# Patient Record
Sex: FEMALE | Employment: FULL TIME | URBAN - METROPOLITAN AREA
[De-identification: names, ages, dates, MRNs, and addresses within clinical notes are randomized per-mention and may not be internally consistent; named-entity substitution may affect disease eponyms.]

---

## 2022-09-22 PROBLEM — J02.9 ACUTE PHARYNGITIS, UNSPECIFIED: Status: ACTIVE | Noted: 2021-05-15

## 2022-09-22 PROBLEM — I83.899 SYMPTOMATIC RETICULAR VEINS: Status: ACTIVE | Noted: 2019-11-14

## 2022-09-22 PROBLEM — R35.0 INCREASED FREQUENCY OF URINATION: Status: ACTIVE | Noted: 2017-06-19

## 2022-09-22 PROBLEM — Z20.822 CONTACT WITH AND (SUSPECTED) EXPOSURE TO COVID-19: Status: ACTIVE | Noted: 2021-05-17

## 2022-09-22 PROBLEM — E66.9 OBESITY: Status: ACTIVE | Noted: 2017-06-19

## 2022-09-22 PROBLEM — N64.89 BREASTS ASYMMETRICAL: Status: ACTIVE | Noted: 2017-11-28

## 2022-09-22 PROBLEM — M25.50 MULTIPLE JOINT PAIN: Status: ACTIVE | Noted: 2017-06-19

## 2022-09-22 PROBLEM — M25.511 ACUTE PAIN OF RIGHT SHOULDER: Status: ACTIVE | Noted: 2019-11-14

## 2022-09-22 PROBLEM — M54.50 LOW BACK PAIN: Status: ACTIVE | Noted: 2017-06-19

## 2022-09-23 PROBLEM — F41.9 ANXIETY: Status: ACTIVE | Noted: 2022-09-23

## 2022-09-23 PROBLEM — M25.561 CHRONIC PAIN OF RIGHT KNEE: Status: ACTIVE | Noted: 2022-09-23

## 2022-09-23 PROBLEM — J02.9 ACUTE PHARYNGITIS, UNSPECIFIED: Status: RESOLVED | Noted: 2021-05-15 | Resolved: 2022-09-23

## 2022-09-23 PROBLEM — E66.9 OBESITY: Status: RESOLVED | Noted: 2017-06-19 | Resolved: 2022-09-23

## 2022-09-23 PROBLEM — E55.9 VITAMIN D DEFICIENCY: Status: RESOLVED | Noted: 2017-06-19 | Resolved: 2022-09-23

## 2022-09-23 PROBLEM — G47.00 INSOMNIA: Status: ACTIVE | Noted: 2022-09-23

## 2022-09-23 PROBLEM — M54.50 LOW BACK PAIN: Status: RESOLVED | Noted: 2017-06-19 | Resolved: 2022-09-23

## 2022-09-23 PROBLEM — R42 DIZZINESS: Status: RESOLVED | Noted: 2017-06-19 | Resolved: 2022-09-23

## 2022-09-23 PROBLEM — Z20.822 CONTACT WITH AND (SUSPECTED) EXPOSURE TO COVID-19: Status: RESOLVED | Noted: 2021-05-17 | Resolved: 2022-09-23

## 2022-09-23 PROBLEM — R79.89 ABNORMAL C-REACTIVE PROTEIN: Status: RESOLVED | Noted: 2018-06-21 | Resolved: 2022-09-23

## 2022-09-23 PROBLEM — M25.50 MULTIPLE JOINT PAIN: Status: RESOLVED | Noted: 2017-06-19 | Resolved: 2022-09-23

## 2022-09-23 PROBLEM — G89.29 CHRONIC PAIN OF RIGHT KNEE: Status: ACTIVE | Noted: 2022-09-23

## 2022-09-24 PROBLEM — E78.2 MIXED HYPERLIPIDEMIA: Status: ACTIVE | Noted: 2022-09-24

## 2022-10-31 PROBLEM — M25.531 BILATERAL WRIST PAIN: Status: ACTIVE | Noted: 2022-10-31

## 2022-10-31 PROBLEM — M79.642 BILATERAL HAND PAIN: Status: ACTIVE | Noted: 2022-10-31

## 2022-10-31 PROBLEM — R53.81 MALAISE: Status: ACTIVE | Noted: 2022-10-31

## 2022-10-31 PROBLEM — R20.0 NUMBNESS AND TINGLING IN BOTH HANDS: Status: ACTIVE | Noted: 2022-10-31

## 2022-10-31 PROBLEM — R10.33 PERIUMBILICAL ABDOMINAL CRAMPING: Status: ACTIVE | Noted: 2022-10-31

## 2022-10-31 PROBLEM — R20.2 NUMBNESS AND TINGLING IN BOTH HANDS: Status: ACTIVE | Noted: 2022-10-31

## 2022-10-31 PROBLEM — M79.641 BILATERAL HAND PAIN: Status: ACTIVE | Noted: 2022-10-31

## 2022-10-31 PROBLEM — M25.532 BILATERAL WRIST PAIN: Status: ACTIVE | Noted: 2022-10-31

## 2023-01-18 ENCOUNTER — PREP FOR PROCEDURE (OUTPATIENT)
Dept: GASTROENTEROLOGY | Age: 53
End: 2023-01-18

## 2023-01-18 PROBLEM — Z12.11 ENCOUNTER FOR SCREENING COLONOSCOPY: Status: ACTIVE | Noted: 2023-01-18

## 2023-01-18 RX ORDER — SODIUM CHLORIDE 0.9 % (FLUSH) 0.9 %
5-40 SYRINGE (ML) INJECTION PRN
Status: CANCELLED | OUTPATIENT
Start: 2023-01-18

## 2023-01-18 RX ORDER — SODIUM CHLORIDE 0.9 % (FLUSH) 0.9 %
5-40 SYRINGE (ML) INJECTION EVERY 12 HOURS SCHEDULED
Status: CANCELLED | OUTPATIENT
Start: 2023-01-18

## 2023-01-18 RX ORDER — SODIUM CHLORIDE 9 MG/ML
25 INJECTION, SOLUTION INTRAVENOUS PRN
Status: CANCELLED | OUTPATIENT
Start: 2023-01-18

## 2023-01-18 NOTE — H&P (VIEW-ONLY)
Margaret Frye (:  1970) is a 47 y.o. female, new patient here for evaluation of the following chief complaint(s):  Colonoscopy         ASSESSMENT/PLAN:  1. Encounter for screening colonoscopy  -     polyethylene glycol-electrolytes (COLYTE) 240 g SOLR solution; Take 4,000 mLs by mouth once for 1 dose, Disp-4000 mL, R-0Normal           Subjective   SUBJECTIVE/OBJECTIVE  Patient present to arrange a screening colonoscopic evaluation denied any rectal bleeding. She has occasional constipation. Denied fever and chills no family history of colon cancer no prior colonoscopy    No past medical history on file. Past Surgical History:   Procedure Laterality Date    OTHER SURGICAL HISTORY      vaginal wall mesh    OTHER SURGICAL HISTORY      gum surgery    OTHER SURGICAL HISTORY      Partial hysterectomy cervix removed per pt    TUBAL LIGATION               No Known Allergies       Review of Systems   Constitutional:  Negative for appetite change. HENT:  Negative for mouth sores and trouble swallowing. Respiratory:  Negative for shortness of breath. Cardiovascular:  Negative for chest pain. Gastrointestinal:  Negative for abdominal pain, blood in stool and vomiting. Skin:  Negative for color change. Allergic/Immunologic: Negative for food allergies. Neurological:  Negative for seizures and weakness. Hematological:  Does not bruise/bleed easily. Objective   Physical Exam  HENT:      Head: Normocephalic. Mouth/Throat:      Mouth: Mucous membranes are moist.   Eyes:      General: No scleral icterus. Cardiovascular:      Rate and Rhythm: Normal rate and regular rhythm. Pulmonary:      Effort: No respiratory distress. Abdominal:      General: There is no distension. Tenderness: There is no abdominal tenderness. There is no rebound. Lymphadenopathy:      Cervical: No cervical adenopathy. Skin:     Coloration: Skin is not jaundiced. Findings: No bruising. Neurological:      General: No focal deficit present. Mental Status: She is alert. Current Outpatient Medications   Medication Sig Dispense Refill    polyethylene glycol-electrolytes (COLYTE) 240 g SOLR solution Take 4,000 mLs by mouth once for 1 dose 4000 mL 0    atorvastatin (LIPITOR) 20 MG tablet Take 1 tablet by mouth daily (Patient not taking: Reported on 1/18/2023) 30 tablet 5    meloxicam (MOBIC) 7.5 MG tablet Take 1 tablet by mouth daily as needed for Pain (take with food-no other NSAIDs) (Patient not taking: Reported on 1/18/2023) 30 tablet 5     No current facility-administered medications for this visit. Family History   Problem Relation Age of Onset    High Cholesterol Mother     High Blood Pressure Mother     Heart Attack Father     Diabetes Brother        Return for colonoscopy. An electronic signature was used to authenticate this note.     --Britni Toledo MD

## 2023-01-26 NOTE — PERIOP NOTE
Patient verified name, , and procedure. Type: 1a; abbreviated assessment per anesthesia guidelines    Labs per anesthesia: none    Instructed pt that they will be notified the day before their procedure by the GI Lab for time of arrival if their procedure is [facility] and Pre-op for [place] cases. Arrival times should be called by 5 pm. If no phone is received the patient should contact their respective hospital. The GI lab telephone number is [phone] and ES Pre-op is [phone]. Follow diet and prep instructions per office including NPO status. If patient has NOT received instructions from office patient is advised to call surgeon office, verbalizes understanding. Bath or shower the night before and the am of surgery with non-moisturizing soap. No lotions, oils, powders, cologne on skin. No make up, eye make up or jewelry. Wear loose fitting comfortable, clean clothing. Must have adult present in building the entire time . Medications for the day of procedure none, patient to hold Vitamins and supplements 7 days and NSAIDS 5 days per anesthesia guidelines. The following discharge instructions reviewed with patient: medication given during procedure may cause drowsiness for several hours, therefore, do not drive or operate machinery for remainder of the day. You may not drink alcohol on the day of your procedure, please resume regular diet and activity unless otherwise directed. You may experience abdominal distention for several hours that is relieved by the passage of gas. Contact your physician if you have any of the following: fever or chills, severe abdominal pain or excessive amount of bleeding or a large amount when having a bowel movement.  Occasional specks of blood with bowel movement would not be unusual.

## 2023-01-27 NOTE — PROGRESS NOTES
Unable to confirm apt with pt. Left voicemail with 0730 arrival time. [Muscle Pain] : muscle pain [Back Pain] : back pain [Fever] : no fever [Chest Pain] : no chest pain [Chills] : no chills [Shortness Of Breath] : no shortness of breath [Palpitations] : no palpitations [Abdominal Pain] : no abdominal pain [Nausea] : no nausea [Cough] : no cough [Dysuria] : no dysuria [Vomiting] : no vomiting [Incontinence] : no incontinence [Muscle Weakness] : no muscle weakness

## 2023-01-29 ENCOUNTER — ANESTHESIA EVENT (OUTPATIENT)
Dept: ENDOSCOPY | Age: 53
End: 2023-01-29
Payer: COMMERCIAL

## 2023-01-29 RX ORDER — SODIUM CHLORIDE 0.9 % (FLUSH) 0.9 %
5-40 SYRINGE (ML) INJECTION EVERY 12 HOURS SCHEDULED
Status: CANCELLED | OUTPATIENT
Start: 2023-01-29

## 2023-01-29 RX ORDER — SODIUM CHLORIDE 0.9 % (FLUSH) 0.9 %
5-40 SYRINGE (ML) INJECTION PRN
Status: CANCELLED | OUTPATIENT
Start: 2023-01-29

## 2023-01-29 RX ORDER — ONDANSETRON 2 MG/ML
4 INJECTION INTRAMUSCULAR; INTRAVENOUS
Status: CANCELLED | OUTPATIENT
Start: 2023-01-29 | End: 2023-01-30

## 2023-01-29 RX ORDER — SODIUM CHLORIDE 9 MG/ML
INJECTION, SOLUTION INTRAVENOUS PRN
Status: CANCELLED | OUTPATIENT
Start: 2023-01-29

## 2023-01-30 ENCOUNTER — HOSPITAL ENCOUNTER (OUTPATIENT)
Age: 53
Setting detail: OUTPATIENT SURGERY
Discharge: HOME | End: 2023-01-30
Attending: INTERNAL MEDICINE | Admitting: INTERNAL MEDICINE
Payer: COMMERCIAL

## 2023-01-30 ENCOUNTER — ANESTHESIA (OUTPATIENT)
Dept: ENDOSCOPY | Age: 53
End: 2023-01-30
Payer: COMMERCIAL

## 2023-01-30 VITALS
SYSTOLIC BLOOD PRESSURE: 129 MMHG | HEIGHT: 63 IN | DIASTOLIC BLOOD PRESSURE: 65 MMHG | TEMPERATURE: 97.6 F | BODY MASS INDEX: 29.77 KG/M2 | RESPIRATION RATE: 16 BRPM | OXYGEN SATURATION: 99 % | WEIGHT: 168 LBS | HEART RATE: 61 BPM

## 2023-01-30 DIAGNOSIS — Z12.11 ENCOUNTER FOR SCREENING COLONOSCOPY: ICD-10-CM

## 2023-01-30 PROCEDURE — 3609010600 HC COLONOSCOPY POLYPECTOMY SNARE/COLD BIOPSY: Performed by: INTERNAL MEDICINE

## 2023-01-30 PROCEDURE — 3700000000 HC ANESTHESIA ATTENDED CARE: Performed by: INTERNAL MEDICINE

## 2023-01-30 PROCEDURE — 6360000002 HC RX W HCPCS: Performed by: NURSE ANESTHETIST, CERTIFIED REGISTERED

## 2023-01-30 PROCEDURE — 2580000003 HC RX 258: Performed by: ANESTHESIOLOGY

## 2023-01-30 PROCEDURE — 7100000010 HC PHASE II RECOVERY - FIRST 15 MIN: Performed by: INTERNAL MEDICINE

## 2023-01-30 PROCEDURE — 2709999900 HC NON-CHARGEABLE SUPPLY: Performed by: INTERNAL MEDICINE

## 2023-01-30 PROCEDURE — 3700000001 HC ADD 15 MINUTES (ANESTHESIA): Performed by: INTERNAL MEDICINE

## 2023-01-30 PROCEDURE — 7100000011 HC PHASE II RECOVERY - ADDTL 15 MIN: Performed by: INTERNAL MEDICINE

## 2023-01-30 RX ORDER — SODIUM CHLORIDE, SODIUM LACTATE, POTASSIUM CHLORIDE, CALCIUM CHLORIDE 600; 310; 30; 20 MG/100ML; MG/100ML; MG/100ML; MG/100ML
INJECTION, SOLUTION INTRAVENOUS CONTINUOUS
Status: DISCONTINUED | OUTPATIENT
Start: 2023-01-30 | End: 2023-01-30 | Stop reason: HOSPADM

## 2023-01-30 RX ORDER — PROPOFOL 10 MG/ML
INJECTION, EMULSION INTRAVENOUS PRN
Status: DISCONTINUED | OUTPATIENT
Start: 2023-01-30 | End: 2023-01-30 | Stop reason: SDUPTHER

## 2023-01-30 RX ORDER — SODIUM CHLORIDE 0.9 % (FLUSH) 0.9 %
5-40 SYRINGE (ML) INJECTION PRN
Status: DISCONTINUED | OUTPATIENT
Start: 2023-01-30 | End: 2023-01-30 | Stop reason: HOSPADM

## 2023-01-30 RX ORDER — LIDOCAINE HYDROCHLORIDE 10 MG/ML
1 INJECTION, SOLUTION INFILTRATION; PERINEURAL
Status: DISCONTINUED | OUTPATIENT
Start: 2023-01-30 | End: 2023-01-30 | Stop reason: HOSPADM

## 2023-01-30 RX ORDER — SODIUM CHLORIDE 0.9 % (FLUSH) 0.9 %
5-40 SYRINGE (ML) INJECTION EVERY 12 HOURS SCHEDULED
Status: DISCONTINUED | OUTPATIENT
Start: 2023-01-30 | End: 2023-01-30 | Stop reason: HOSPADM

## 2023-01-30 RX ORDER — SODIUM CHLORIDE 9 MG/ML
INJECTION, SOLUTION INTRAVENOUS PRN
Status: DISCONTINUED | OUTPATIENT
Start: 2023-01-30 | End: 2023-01-30 | Stop reason: HOSPADM

## 2023-01-30 RX ADMIN — SODIUM CHLORIDE, POTASSIUM CHLORIDE, SODIUM LACTATE AND CALCIUM CHLORIDE: 600; 310; 30; 20 INJECTION, SOLUTION INTRAVENOUS at 07:58

## 2023-01-30 RX ADMIN — PROPOFOL 120 MCG/KG/MIN: 10 INJECTION, EMULSION INTRAVENOUS at 08:44

## 2023-01-30 RX ADMIN — PROPOFOL 50 MG: 10 INJECTION, EMULSION INTRAVENOUS at 08:43

## 2023-01-30 ASSESSMENT — PAIN - FUNCTIONAL ASSESSMENT: PAIN_FUNCTIONAL_ASSESSMENT: NONE - DENIES PAIN

## 2023-01-30 NOTE — INTERVAL H&P NOTE
Update History & Physical    The patient's History and Physical of January 18,  was reviewed with the patient and I examined the patient. There was no change. The surgical site was confirmed by the patient and me. Plan: The risks, benefits, expected outcome, and alternative to the recommended procedure have been discussed with the patient. Patient understands and wants to proceed with the procedure.      Electronically signed by Tan Connell MD on 1/30/2023 at 8:08 AM

## 2023-01-30 NOTE — DISCHARGE INSTRUCTIONS
Gastrointestinal Colonoscopy/Flexible Sigmoidoscopy - Lower Exam Discharge Instructions    Call Dr. Anna Hanley at [phone] for any problems or questions. Contact the doctors office for follow up appointment as directed. Medication may cause drowsiness for several hours, therefore:  Do not drive or operate machinery for reminder of the day. No alcohol today. Do not make any important or legal decisions for 24 hours. Do not sign any legal documents for 24 hours. Ordinarily, you may resume regular diet and activity after exam unless otherwise specified by your physician. Because of air put into your colon during exam, you may experience some abdominal distension, relieved by the passage of gas, for several hours. Contact your physician if you have any of the following:  Excessive amount of bleeding - large amount when having a bowel movement. Occasional specks of blood with bowel movement would not be unusual.  Severe abdominal pain  Fever or Chills    Polyp Removal - follow these additional instructions   Take Metamucil - 1 tablespoon in juice every morning for 3 days, as needed for constipation.       Any additional instructions:  repeat colonoscopy in 2 years

## 2023-01-30 NOTE — PROCEDURES
Operative Report    Patient: Gin Smiley MRN: [id number]      YOB: 1970  Age: 47 y.o. Sex: female            Indications:  screening for colon cancer [unfilled]     Preoperative Evaluation: The patient was evaluated prior to the procedure in the GI lab admission area, the patient ASA was recorded . Consent was obtained from the patient with the risk of perforation bleeding and aspiration. Anesthesia: ELYSIA-per anesthesia    Complications: None; patient tolerated the procedure well. EBL -insignificant      Procedure: The patient was sedated in the left lateral decubitus position. Scope was advanced from the rectum to the cecum. Evaluation was performed to the cecum twice. The scope was withdrawn to the rectum, retroflexed view was performed. The rectal exam was normal.  Preparation was fair [place] score of 2/1/2:5 . Findings:   Exam to the cecum. 2 passes performed into the ascending colon. A 5 mm raised cecal polyp that was cold snared. Normal ascending colon mucosa. Normal transverse descending sigmoid and rectal mucosa with no sign of polyps. Scattered left-sided diverticulosis. The preparation was suboptimal with a [place] score of 5. Postoperative Diagnosis: 1-cecal polyp.   2-diverticulosis    [id number] Colonoscopy, Flexible; with removal of tumor(s), polyp(s), or other lesion(s) by snare technique      Recommendations:    - repeat exam in 2 years      Signed By:  Tian Rojas MD     January 30, 2023

## 2023-01-30 NOTE — ANESTHESIA POSTPROCEDURE EVALUATION
Department of Anesthesiology  Postprocedure Note    Patient: Alvin Saucedo  MRN: [id number]  YOB: 1970  Date of evaluation: 1/30/2023      Procedure Summary     Date: 01/30/23 Room / Location: [facility] ENDO 03 / [facility] ENDOSCOPY    Anesthesia Start: 5085 Anesthesia Stop: 5876    Procedure: COLONOSCOPY POLYPECTOMY SNARE/COLD BIOPSY (Lower GI Region) Diagnosis:       Encounter for screening colonoscopy      (Encounter for screening colonoscopy [Z12.11])    Surgeons: Judy Bravo MD Responsible Provider: Luci Alegria MD    Anesthesia Type: TIVA ASA Status: 2          Anesthesia Type: No value filed. Cameron Phase I: Cameron Score: 10    Cameron Phase II: Cameron Score: 10      Anesthesia Post Evaluation    Patient location during evaluation: PACU  Patient participation: complete - patient participated  Level of consciousness: awake and alert  Airway patency: patent  Nausea & Vomiting: no nausea and no vomiting  Complications: no  Cardiovascular status: hemodynamically stable  Respiratory status: acceptable  Hydration status: euvolemic  Comments: Blood pressure 127/65, pulse 59, temperature 97.6 °F (36.4 °C), temperature source Temporal, resp. rate 16, height 5' 3\" (1.6 m), weight 168 lb (76.2 kg), SpO2 100 %.       Pt stable for discharge from PACU  Multimodal analgesia pain management approach

## 2023-01-31 ENCOUNTER — OFFICE VISIT (OUTPATIENT)
Dept: FAMILY MEDICINE CLINIC | Facility: CLINIC | Age: 53
End: 2023-01-31
Payer: COMMERCIAL

## 2023-01-31 VITALS
RESPIRATION RATE: 18 BRPM | HEIGHT: 63 IN | HEART RATE: 77 BPM | DIASTOLIC BLOOD PRESSURE: 60 MMHG | BODY MASS INDEX: 29.52 KG/M2 | OXYGEN SATURATION: 98 % | WEIGHT: 166.6 LBS | SYSTOLIC BLOOD PRESSURE: 124 MMHG

## 2023-01-31 DIAGNOSIS — M79.641 BILATERAL HAND PAIN: ICD-10-CM

## 2023-01-31 DIAGNOSIS — M25.561 CHRONIC PAIN OF RIGHT KNEE: ICD-10-CM

## 2023-01-31 DIAGNOSIS — G89.29 CHRONIC PAIN OF RIGHT KNEE: ICD-10-CM

## 2023-01-31 DIAGNOSIS — R20.2 NUMBNESS AND TINGLING IN BOTH HANDS: ICD-10-CM

## 2023-01-31 DIAGNOSIS — G47.00 INSOMNIA, UNSPECIFIED TYPE: ICD-10-CM

## 2023-01-31 DIAGNOSIS — F32.A DEPRESSION, UNSPECIFIED DEPRESSION TYPE: Primary | ICD-10-CM

## 2023-01-31 DIAGNOSIS — E78.2 MIXED HYPERLIPIDEMIA: ICD-10-CM

## 2023-01-31 DIAGNOSIS — R20.0 NUMBNESS AND TINGLING IN BOTH HANDS: ICD-10-CM

## 2023-01-31 DIAGNOSIS — Z12.4 SCREENING FOR CERVICAL CANCER: ICD-10-CM

## 2023-01-31 DIAGNOSIS — M79.642 BILATERAL HAND PAIN: ICD-10-CM

## 2023-01-31 DIAGNOSIS — M25.531 BILATERAL WRIST PAIN: ICD-10-CM

## 2023-01-31 DIAGNOSIS — M25.532 BILATERAL WRIST PAIN: ICD-10-CM

## 2023-01-31 DIAGNOSIS — Z12.31 ENCOUNTER FOR SCREENING MAMMOGRAM FOR MALIGNANT NEOPLASM OF BREAST: ICD-10-CM

## 2023-01-31 DIAGNOSIS — Z23 ENCOUNTER FOR IMMUNIZATION: ICD-10-CM

## 2023-01-31 DIAGNOSIS — Z12.11 SCREENING FOR COLON CANCER: ICD-10-CM

## 2023-01-31 DIAGNOSIS — F41.9 ANXIETY: ICD-10-CM

## 2023-01-31 PROCEDURE — 99214 OFFICE O/P EST MOD 30 MIN: CPT | Performed by: NURSE PRACTITIONER

## 2023-01-31 RX ORDER — ATORVASTATIN CALCIUM 20 MG/1
20 TABLET, FILM COATED ORAL DAILY
Qty: 30 TABLET | Refills: 5 | Status: SHIPPED | OUTPATIENT
Start: 2023-01-31

## 2023-01-31 RX ORDER — MELOXICAM 7.5 MG/1
7.5 TABLET ORAL DAILY PRN
Qty: 30 TABLET | Refills: 5 | Status: SHIPPED | OUTPATIENT
Start: 2023-01-31

## 2023-01-31 RX ORDER — POLYETHYLENE GLYCOL-3350 AND ELECTROLYTES WITH FLAVOR PACK 240; 5.84; 2.98; 6.72; 22.72 G/278.26G; G/278.26G; G/278.26G; G/278.26G; G/278.26G
POWDER, FOR SOLUTION ORAL
COMMUNITY
Start: 2023-01-18 | End: 2023-01-31 | Stop reason: ALTCHOICE

## 2023-01-31 RX ORDER — BUSPIRONE HYDROCHLORIDE 7.5 MG/1
7.5 TABLET ORAL 2 TIMES DAILY
Qty: 60 TABLET | Refills: 5 | Status: SHIPPED | OUTPATIENT
Start: 2023-01-31

## 2023-01-31 ASSESSMENT — ANXIETY QUESTIONNAIRES
6. BECOMING EASILY ANNOYED OR IRRITABLE: 0
IF YOU CHECKED OFF ANY PROBLEMS ON THIS QUESTIONNAIRE, HOW DIFFICULT HAVE THESE PROBLEMS MADE IT FOR YOU TO DO YOUR WORK, TAKE CARE OF THINGS AT HOME, OR GET ALONG WITH OTHER PEOPLE: VERY DIFFICULT
4. TROUBLE RELAXING: 1
1. FEELING NERVOUS, ANXIOUS, OR ON EDGE: 3
GAD7 TOTAL SCORE: 9
5. BEING SO RESTLESS THAT IT IS HARD TO SIT STILL: 0
3. WORRYING TOO MUCH ABOUT DIFFERENT THINGS: 1
7. FEELING AFRAID AS IF SOMETHING AWFUL MIGHT HAPPEN: 1
2. NOT BEING ABLE TO STOP OR CONTROL WORRYING: 3

## 2023-01-31 ASSESSMENT — PATIENT HEALTH QUESTIONNAIRE - PHQ9
SUM OF ALL RESPONSES TO PHQ QUESTIONS 1-9: 12
SUM OF ALL RESPONSES TO PHQ QUESTIONS 1-9: 12
4. FEELING TIRED OR HAVING LITTLE ENERGY: 1
7. TROUBLE CONCENTRATING ON THINGS, SUCH AS READING THE NEWSPAPER OR WATCHING TELEVISION: 3
SUM OF ALL RESPONSES TO PHQ9 QUESTIONS 1 & 2: 3
3. TROUBLE FALLING OR STAYING ASLEEP: 3
SUM OF ALL RESPONSES TO PHQ QUESTIONS 1-9: 12
8. MOVING OR SPEAKING SO SLOWLY THAT OTHER PEOPLE COULD HAVE NOTICED. OR THE OPPOSITE, BEING SO FIGETY OR RESTLESS THAT YOU HAVE BEEN MOVING AROUND A LOT MORE THAN USUAL: 0
10. IF YOU CHECKED OFF ANY PROBLEMS, HOW DIFFICULT HAVE THESE PROBLEMS MADE IT FOR YOU TO DO YOUR WORK, TAKE CARE OF THINGS AT HOME, OR GET ALONG WITH OTHER PEOPLE: 2
1. LITTLE INTEREST OR PLEASURE IN DOING THINGS: 1
6. FEELING BAD ABOUT YOURSELF - OR THAT YOU ARE A FAILURE OR HAVE LET YOURSELF OR YOUR FAMILY DOWN: 1
9. THOUGHTS THAT YOU WOULD BE BETTER OFF DEAD, OR OF HURTING YOURSELF: 0
2. FEELING DOWN, DEPRESSED OR HOPELESS: 2
SUM OF ALL RESPONSES TO PHQ QUESTIONS 1-9: 12
5. POOR APPETITE OR OVEREATING: 1

## 2023-01-31 NOTE — PROGRESS NOTES
[facility] [address] [facility]  Phone: [phone] Fax [phone]  Blaine Baronkamilahkashif. Sangeeta MS, APRN, FNP-C  1/31/2023  Chief Complaint   Patient presents with    Follow-up     Pt here today for f/u to recheck depression, anxiety, insomnia as well as chronic MSK complaints and HLD. Pt reports  following previous POC as directed. Please see ROS/Assessment and Plan sections for full discussion/management of these issues. Pt is Hebrew speaking and her daughter accompanied her today and served as English/. Pt has not yet had her repeat fasting lipids drawn. ASSESSMENT/PLAN:  Below is the assessment and plan developed based on review of pertinent history, physical exam, labs, studies, and medications. 1. Depression, unspecified depression type  2. Anxiety  Pt has been having some increased depressed moods, feelings of worry, and difficulty falling asleep over past year due to the stress of caring for her  who suffers from Schizophrenia. Pt reports that her adult son and adult daughter have moved in with her to help and offer her good support. Pt reports taking Buspar 7.5 mg po bid and PRN OTC Melatonin as directed. Pt reports depressed moods and feelings of worry as well as difficulty falling asleep had  improved some, but then yesterday, the pt witnessed her  being arrested for a violent act against another individual.  is in [facility]. Pt reports that this significantly increased her stress levels. Pt continues to deny any SI, HI, hallucinations. Pt was previously referred to a counselor through [facility] but never heard about an appointment. Discussed with pt. I feel that counseling is really what she needs at this time. Urgent referral placed for outside counseling from [address] [place] [address] Psychiatry. Will continue current dose of Buspar-refill given. Will continue PRN OTC Melatonin. Pt to f/u with me in 4-6 weeks to recheck.  Pt agrees to call me sooner for concerns/new or worsening symptoms. Will monitor.   - busPIRone (BUSPAR) 7.5 MG tablet; Take 1 tablet by mouth 2 times daily  Dispense: 60 tablet; Refill: 5  - External Referral to Psychiatry    3. Insomnia, unspecified type  See # 1-2 above. - External Referral to Psychiatry    4. Chronic pain of right knee  Pt under care of POA for chronic right knee pain. Pt saw them 10/25/22 and 1/23/23. Had MRI 12/19/22. POA feels chronic pain related to meniscus issue. Recommended PT-referral placed but pt has yet to make an appointment. Also gave pt steroid injection at most recent appointment and continued PRN Mobic. Pt reports pain is much better. Now mild and much more manageable. Has f/u with POA 3/6/23. Discussed with pt. Will have pt continue POC/PRN Mobic per POA for chronic right knee pain-refill given. Pt given number to PT to call to schedule an appointment. Pt to f/u with me in 4-6 weeks. Will monitor.    - meloxicam (MOBIC) 7.5 MG tablet; Take 1 tablet by mouth daily as needed for Pain (take with food-no other NSAIDs)  Dispense: 30 tablet; Refill: 5    5. Bilateral hand pain  6. Bilateral wrist pain  7. Numbness and tingling in both hands  Pt reports having ongoing bilat hand/wrist pain with intermittent numbness/tingling to bilat hands for past several months. Symptoms worse when sleeping at night and when trying to use hands at work. Denies any increased warmth or erythema. Denies any focal weakness to bilat hands. Pt was given Rx for bilat cock up splints for pt to wear while at work to help, but never picked them up. Pt was referred to [facility] for further evaluation of possible CTS, but never heard about an appointment. Placed another referral. Pt reports taking Mobic 7.5 mg po daily PRN as directed with some relief. Discussed with pt. Will have pt go  bilat cock up splints for pt to wear while at work to help. Pt given number to [facility] to call to make an appointment. Pt can continue PRN Mobic as directed as well-refill given. Pt to f/u with me in 4-6 weeks to recheck. Will monitor.   - meloxicam (MOBIC) 7.5 MG tablet; Take 1 tablet by mouth daily as needed for Pain (take with food-no other NSAIDs)  Dispense: 30 tablet; Refill: 5  - [address] [place] [address]    8. Mixed hyperlipidemia  On 22, pt's trigs were elevated at 287, LDL-C was elevated at 102.6, and HDL was low at 31. Pt was started on Lipitor 20 mg po daily. Pt reports taking as directed and reports following heart healthy diet. Discussed with pt. Will have pt continue current dose of Lipitor for now-refill given and continue heart healthy diet. Will recheck fasting lipids prior to f/u with me in 4-6 weeks. Order already in. Will monitor.   - atorvastatin (LIPITOR) 20 MG tablet; Take 1 tablet by mouth daily  Dispense: 30 tablet; Refill: 5    9. Screening for cervical cancer  Pt reports having a hx of a partial hysterectomy. Per pt, cervix was removed and pt no longer gets PAP. 10. Screening for colon cancer  Pt had screening colonoscopy with GI-Dr. Raeford Cabot 23. Due for repeat screening colonoscopy at 2 year point in 2025. 11. Encounter for screening mammogram for malignant neoplasm of breast  Mammogram Birads 2 benign on 22 after comparing to previous. Recommend repeat screening mammogram in 1 year point in 2023. 12. Encounter for immunization  Pt UTD on Tdap. Pt declined Covid, Flu, Shingles vaccines today. Will continue to encourage pt to get UTD on these vaccines at future appointments. Return in about 4 weeks (around 2023) for F/u 4-6 weeks to recheck multiple complaints (have fasting lipids prior). Call sooner for concerns.         SUBJECTIVE/OBJECTIVE:    HPI 22-  [place] (: 1970) is a 49 y.o. female, Established patient patient, here for evaluation of the following chief complaint(s):  Follow-up (Pt here today for f/u for routine lab review and to recheck depression, anxiety, insomnia as well as chronic MSK complaints and HLD. Pt reports  following previous POC as directed. Please see ROS/Assessment and Plan sections for full discussion/management of these issues. Pt is Faroese speaking and her daughter accompanied her today and served as English/. )    HPI today-  Evelyne Cogan (: 1970) is a 47 y.o. female, Established patient patient, here for evaluation of the following chief complaint(s):  Follow-up (Pt here today for f/u to recheck depression, anxiety, insomnia as well as chronic MSK complaints and HLD. Pt reports  following previous POC as directed. Please see ROS/Assessment and Plan sections for full discussion/management of these issues. Pt is Faroese speaking and her daughter accompanied her today and served as English/. Pt has not yet had her repeat fasting lipids drawn. )     No Known Allergies  [unfilled]  History reviewed. No pertinent past medical history. Past Surgical History:   Procedure Laterality Date    COLONOSCOPY N/A 2023    COLONOSCOPY POLYPECTOMY SNARE/COLD BIOPSY performed by Erica Driver MD at [facility] ENDOSCOPY    OTHER SURGICAL HISTORY      vaginal wall mesh    OTHER SURGICAL HISTORY      gum surgery    OTHER SURGICAL HISTORY      Partial hysterectomy cervix removed per pt    TUBAL LIGATION       Family History   Problem Relation Age of Onset    High Cholesterol Mother     High Blood Pressure Mother     Heart Attack Father     Diabetes Brother      Social History     Tobacco Use   Smoking Status Never   Smokeless Tobacco Never         Review of Systems   Constitutional: Negative. Negative for appetite change, chills, diaphoresis, fatigue, fever and unexpected weight change. HENT: Negative.   Negative for congestion, dental problem, drooling, ear discharge, ear pain, facial swelling, hearing loss, mouth sores, nosebleeds, postnasal drip, rhinorrhea, sinus pressure, sinus pain, sneezing, sore throat, tinnitus, trouble swallowing and voice change. Eyes: Negative. Negative for pain, discharge and visual disturbance. Respiratory: Negative. Negative for apnea, cough, choking, chest tightness, shortness of breath, wheezing and stridor. Cardiovascular: Negative. Negative for chest pain, palpitations and leg swelling. Gastrointestinal: Negative. Negative for abdominal distention, abdominal pain, anal bleeding, blood in stool, constipation, diarrhea, nausea, rectal pain and vomiting. Endocrine: Negative. Negative for cold intolerance, heat intolerance, polydipsia, polyphagia and polyuria. Genitourinary: Negative. Negative for decreased urine volume, difficulty urinating, dyspareunia, dysuria, flank pain, frequency, genital sores, hematuria, menstrual problem, pelvic pain, urgency, vaginal bleeding, vaginal discharge and vaginal pain. Musculoskeletal:  Positive for arthralgias and joint swelling. Negative for back pain, gait problem, myalgias, neck pain and neck stiffness. Pt under care of POA for chronic right knee pain. Pt saw them 10/25/22 and 1/23/23. Had MRI 12/19/22. POA feels chronic pain related to meniscus issue. Recommended PT-referral placed but pt has yet to make an appointment. Also gave pt steroid injection at most recent appointment and continued PRN Mobic. Pt reports pain is much better. Now mild and much more manageable. Has f/u with POA 3/6/23. Pt reports having ongoing bilat hand/wrist pain with intermittent numbness/tingling to bilat hands for past several months. Symptoms worse when sleeping at night and when trying to use hands at work. Denies any increased warmth or erythema. Denies any focal weakness to bilat hands. Pt was given Rx for bilat cock up splints for pt to wear while at work to help, but never picked them up.  Pt was referred to [facility] for further evaluation of possible CTS, but never heard about an appointment. Placed another referral. Pt reports taking Mobic 7.5 mg po daily PRN as directed with some relief. Skin: Negative. Negative for color change, pallor, rash and wound. Allergic/Immunologic: Negative. Negative for environmental allergies. Neurological:  Positive for numbness (see MSK). Negative for dizziness, tremors, seizures, syncope, facial asymmetry, speech difficulty, weakness, light-headedness and headaches. Hematological: Negative. Negative for adenopathy. Does not bruise/bleed easily. Psychiatric/Behavioral:  Positive for dysphoric mood. Negative for hallucinations, self-injury, sleep disturbance and suicidal ideas. The patient is nervous/anxious. Pt has been having some increased depressed moods, feelings of worry, and difficulty falling asleep over past year due to the stress of caring for her  who suffers from Schizophrenia. Pt reports that her adult son and adult daughter have moved in with her to help and offer her good support. Pt reports taking Buspar 7.5 mg po bid and PRN OTC Melatonin as directed. Pt reports depressed moods and feelings of worry as well as difficulty falling asleep had  improved some, but then yesterday, the pt witnessed her  being arrested for a violent act against another individual.  is in [facility]. Pt reports that this significantly increased her stress levels. Pt continues to deny any SI, HI, hallucinations. Pt was previously referred to a counselor through [facility] but never heard about an appointment. Vitals:    01/31/23 1400   BP: 124/60   Pulse: 77   Resp: 18   SpO2: 98%       Physical Exam  Vitals reviewed. Constitutional:       General: She is not in acute distress. Appearance: Normal appearance. She is normal weight. She is not ill-appearing, toxic-appearing or diaphoretic. HENT:      Head: Normocephalic and atraumatic.       Mouth/Throat:      Mouth: Mucous membranes are moist. Pharynx: Oropharynx is clear. Eyes:      General: No scleral icterus. Extraocular Movements: Extraocular movements intact. Pupils: Pupils are equal, round, and reactive to light. Cardiovascular:      Rate and Rhythm: Normal rate and regular rhythm. Pulses: Normal pulses. Heart sounds: Normal heart sounds. No murmur heard. No friction rub. No gallop. Pulmonary:      Effort: Pulmonary effort is normal. No respiratory distress. Breath sounds: Normal breath sounds. No stridor. No wheezing, rhonchi or rales. Chest:      Chest wall: No tenderness. Abdominal:      General: Abdomen is flat. Bowel sounds are normal. There is no distension. Palpations: Abdomen is soft. Tenderness: There is no abdominal tenderness. There is no guarding. Musculoskeletal:         General: Swelling and tenderness present. No deformity or signs of injury. Normal range of motion. Cervical back: Normal range of motion and neck supple. No rigidity or tenderness. Right lower leg: No edema. Left lower leg: No edema. Comments: Focused on right knee: No tenderness, edema, erythema, or increased warmth noted to right knee. ROM intact without crepitus. Cannot displace in any plane. Pt able to bear weight and ambulates with steady gait, unassisted    Focused on bilat hands/wrists: No ttp noted to bilat hands. Mild generalized ttp noted to volar surface of bilat wrists with +Tinel's sign bilat. No increased warmth, edema, erythema noted to either hand or wrist. ROM intact to bilat hands/wrists without crepitus. No focal weakness noted to either hand. Strong and equal  strength bilat. Lymphadenopathy:      Cervical: No cervical adenopathy. Skin:     General: Skin is warm. Capillary Refill: Capillary refill takes less than 2 seconds. Coloration: Skin is not jaundiced or pale. Findings: No bruising, erythema, lesion or rash.    Neurological:      General: No focal deficit present. Mental Status: She is alert and oriented to person, place, and time. Cranial Nerves: No cranial nerve deficit. Sensory: No sensory deficit. Motor: No weakness. Coordination: Coordination normal.      Gait: Gait normal.   Psychiatric:         Mood and Affect: Mood normal.         Behavior: Behavior normal.         Thought Content: Thought content normal.         Judgment: Judgment normal.      Comments: Pt appears mildly anxious/depressed but is cooperative. Makes good eye contact. Clear mentation. Answers questions appropriately. Denies any SI, HI, hallucinations   PHQ-9 Total Score: 12 (1/31/2023  2:09 PM)  Thoughts that you would be better off dead, or of hurting yourself in some way: 0 (1/31/2023  2:09 PM)  DEBORA-7 SCREENING 1/31/2023 9/23/2022   Feeling nervous, anxious, or on edge Nearly every day Not at all   Not being able to stop or control worrying Nearly every day Nearly every day   Worrying too much about different things Several days More than half the days   Trouble relaxing Several days Not at all   Being so restless that it is hard to sit still Not at all Not at all   Becoming easily annoyed or irritable Not at all More than half the days   Feeling afraid as if something awful might happen Several days Several days   DEBORA-7 Total Score 9 8   How difficult have these problems made it for you to do your work, take care of things at home, or get along with other people? Very difficult Somewhat difficult       PLEASE NOTE:  This document has been produced using voice recognition software. Unrecognized errors in transcription may be present. On this date 1/31/2023 I have spent 30 minutes reviewing previous notes, test results and face to face with the patient discussing the diagnosis and importance of compliance with the treatment plan as well as documenting on the day of the visit. An electronic signature was used to authenticate this note.   -- Kartik Rg FLOYD [place]utherford - NP

## 2023-02-01 ASSESSMENT — ENCOUNTER SYMPTOMS
ANAL BLEEDING: 0
EYES NEGATIVE: 1
CHEST TIGHTNESS: 0
TROUBLE SWALLOWING: 0
STRIDOR: 0
SHORTNESS OF BREATH: 0
ABDOMINAL DISTENTION: 0
WHEEZING: 0
BLOOD IN STOOL: 0
FACIAL SWELLING: 0
VOMITING: 0
VOICE CHANGE: 0
RESPIRATORY NEGATIVE: 1
RECTAL PAIN: 0
BACK PAIN: 0
SINUS PAIN: 0
EYE PAIN: 0
DIARRHEA: 0
NAUSEA: 0
CHOKING: 0
SORE THROAT: 0
SINUS PRESSURE: 0
COUGH: 0
COLOR CHANGE: 0
ALLERGIC/IMMUNOLOGIC NEGATIVE: 1
GASTROINTESTINAL NEGATIVE: 1
EYE DISCHARGE: 0
APNEA: 0
RHINORRHEA: 0
CONSTIPATION: 0
ABDOMINAL PAIN: 0

## 2023-02-17 PROBLEM — Z12.11 ENCOUNTER FOR SCREENING COLONOSCOPY: Status: RESOLVED | Noted: 2023-01-18 | Resolved: 2023-02-17

## 2023-03-06 ENCOUNTER — NURSE ONLY (OUTPATIENT)
Dept: FAMILY MEDICINE CLINIC | Facility: CLINIC | Age: 53
End: 2023-03-06

## 2023-03-06 ENCOUNTER — OFFICE VISIT (OUTPATIENT)
Dept: ORTHOPEDIC SURGERY | Age: 53
End: 2023-03-06

## 2023-03-06 DIAGNOSIS — E78.2 MIXED HYPERLIPIDEMIA: ICD-10-CM

## 2023-03-06 DIAGNOSIS — M23.300 DEGENERATIVE TEAR OF LATERAL MENISCUS OF RIGHT KNEE: Primary | ICD-10-CM

## 2023-03-06 DIAGNOSIS — M25.461 EFFUSION OF RIGHT KNEE: ICD-10-CM

## 2023-03-06 DIAGNOSIS — M25.561 CHRONIC PAIN OF RIGHT KNEE: ICD-10-CM

## 2023-03-06 DIAGNOSIS — G89.29 CHRONIC PAIN OF RIGHT KNEE: ICD-10-CM

## 2023-03-06 NOTE — PROGRESS NOTES
Name: Atul Reyna  YOB: 1970  Gender: female  MRN: [id number]      CC: Knee Pain (R Knee )       HPI: Atul Reyna is a 47 y.o. female who returns for follow up on 3/6/2023. She reports he experienced relief following the injection, however the relief only lasted about 5 weeks. Following that she had a return of pain and swelling. Physical Examination:  General: no acute distress  Lungs: breathing easily  CV: regular rhythm by pulse  Right Knee:moderate effusion. TTP posterior knee/popliteal fossa. Pain with mcmurrays testing, ligamentously stable         Assessment:     ICD-10-CM    1. Degenerative tear of lateral meniscus of right knee  M23.300       2. Effusion of right knee  M25.461       3. Chronic pain of right knee  M25.561     G89.29           Plan:   Continues to have swelling and pain after transient response to intraarticular injection. I think she has a focal collection of synovitis, possible PVNS in addition to a meniscus tear. She has failed extensive non op care. We discussed the possibility of knee arthroscopy which she is ready to proceed with once her work schedule is arranged. We discussed the possibility of recurrence, and possible need for revision surgery with open approach if we are not able to get it all arthroscopically. We discussed the details risks and benefits of knee arthroscopy with meniscal debridement and possible chondroplasty including but not limited to anesthetic complications bleeding infection postoperative DVT/PE continued pain further progression of degenerative changes and incomplete resolution of symptoms as well as the possible need for further surgery in the rehab course and recovery period that is expected. Patient elects to proceed as planned after all of their questions have been answered     Surgical plan will be for right knee arthroscopy, synovectomy, partial meniscectomy        Seth Ty MD, [facility]

## 2023-03-07 LAB
CHOLEST SERPL-MCNC: 203 MG/DL
HDLC SERPL-MCNC: 40 MG/DL (ref 40–60)
HDLC SERPL: 5.1
LDLC SERPL CALC-MCNC: 127.6 MG/DL
TRIGL SERPL-MCNC: 177 MG/DL (ref 35–150)
VLDLC SERPL CALC-MCNC: 35.4 MG/DL (ref 6–23)

## 2023-03-09 ENCOUNTER — OFFICE VISIT (OUTPATIENT)
Dept: FAMILY MEDICINE CLINIC | Facility: CLINIC | Age: 53
End: 2023-03-09
Payer: COMMERCIAL

## 2023-03-09 VITALS
HEART RATE: 78 BPM | HEIGHT: 63 IN | OXYGEN SATURATION: 97 % | SYSTOLIC BLOOD PRESSURE: 122 MMHG | WEIGHT: 171 LBS | BODY MASS INDEX: 30.3 KG/M2 | RESPIRATION RATE: 16 BRPM | DIASTOLIC BLOOD PRESSURE: 78 MMHG

## 2023-03-09 DIAGNOSIS — Z23 ENCOUNTER FOR IMMUNIZATION: ICD-10-CM

## 2023-03-09 DIAGNOSIS — G89.29 CHRONIC PAIN OF RIGHT KNEE: ICD-10-CM

## 2023-03-09 DIAGNOSIS — Z12.4 SCREENING FOR CERVICAL CANCER: ICD-10-CM

## 2023-03-09 DIAGNOSIS — M25.532 BILATERAL WRIST PAIN: ICD-10-CM

## 2023-03-09 DIAGNOSIS — F41.9 ANXIETY: ICD-10-CM

## 2023-03-09 DIAGNOSIS — M79.641 BILATERAL HAND PAIN: ICD-10-CM

## 2023-03-09 DIAGNOSIS — F32.A DEPRESSION, UNSPECIFIED DEPRESSION TYPE: Primary | ICD-10-CM

## 2023-03-09 DIAGNOSIS — M25.561 CHRONIC PAIN OF RIGHT KNEE: ICD-10-CM

## 2023-03-09 DIAGNOSIS — Z12.11 SCREENING FOR COLON CANCER: ICD-10-CM

## 2023-03-09 DIAGNOSIS — R20.2 NUMBNESS AND TINGLING IN BOTH HANDS: ICD-10-CM

## 2023-03-09 DIAGNOSIS — G47.00 INSOMNIA, UNSPECIFIED TYPE: ICD-10-CM

## 2023-03-09 DIAGNOSIS — R20.0 NUMBNESS AND TINGLING IN BOTH HANDS: ICD-10-CM

## 2023-03-09 DIAGNOSIS — M79.642 BILATERAL HAND PAIN: ICD-10-CM

## 2023-03-09 DIAGNOSIS — M25.531 BILATERAL WRIST PAIN: ICD-10-CM

## 2023-03-09 DIAGNOSIS — Z12.31 ENCOUNTER FOR SCREENING MAMMOGRAM FOR MALIGNANT NEOPLASM OF BREAST: ICD-10-CM

## 2023-03-09 DIAGNOSIS — E78.2 MIXED HYPERLIPIDEMIA: ICD-10-CM

## 2023-03-09 PROBLEM — E87.6 HYPOKALEMIA: Status: RESOLVED | Noted: 2022-11-01 | Resolved: 2023-03-09

## 2023-03-09 PROBLEM — A08.4 VIRAL GASTROENTERITIS: Status: RESOLVED | Noted: 2022-10-31 | Resolved: 2023-03-09

## 2023-03-09 PROBLEM — R10.33 PERIUMBILICAL ABDOMINAL CRAMPING: Status: RESOLVED | Noted: 2022-10-31 | Resolved: 2023-03-09

## 2023-03-09 PROBLEM — R53.81 MALAISE: Status: RESOLVED | Noted: 2022-10-31 | Resolved: 2023-03-09

## 2023-03-09 PROBLEM — R19.7 DIARRHEA: Status: RESOLVED | Noted: 2022-10-31 | Resolved: 2023-03-09

## 2023-03-09 PROCEDURE — 99214 OFFICE O/P EST MOD 30 MIN: CPT | Performed by: NURSE PRACTITIONER

## 2023-03-09 RX ORDER — ATORVASTATIN CALCIUM 40 MG/1
40 TABLET, FILM COATED ORAL DAILY
Qty: 30 TABLET | Refills: 5 | Status: SHIPPED | OUTPATIENT
Start: 2023-03-09

## 2023-03-09 RX ORDER — BUSPIRONE HYDROCHLORIDE 7.5 MG/1
7.5 TABLET ORAL 2 TIMES DAILY
Qty: 60 TABLET | Refills: 5 | Status: SHIPPED | OUTPATIENT
Start: 2023-03-09

## 2023-03-09 RX ORDER — NAPROXEN 250 MG/1
250 TABLET ORAL 2 TIMES DAILY PRN
Qty: 30 TABLET | Refills: 5 | Status: SHIPPED | OUTPATIENT
Start: 2023-03-09

## 2023-03-09 NOTE — PROGRESS NOTES
[address] [facility] [place] 972, [facility]  Phone: [phone] Fax [phone]  Bridgett Gaines. Sangeeta MS, APRN, FNP-C  3/9/2023  Chief Complaint   Patient presents with    Follow-up     Pt here today for f/u to recheck depression, anxiety, insomnia as well as chronic MSK complaints and HLD. Pt reports taking medications/following previous POC as directed. Please see ROS/Assessment and Plan sections for full discussion/management of these issues. Pt is Turkish speaking and her daughter accompanied her today and served as English/. ASSESSMENT/PLAN:  Below is the assessment and plan developed based on review of pertinent history, physical exam, labs, studies, and medications. 1. Depression, unspecified depression type  2. Anxiety   Pt has been having some increased depressed moods, feelings of worry, and difficulty falling asleep over past year due to the stress of caring for her  who suffers from Schizophrenia. Pt reports that her adult son and adult daughter have moved in with her to help and offer her good support. Pt reports taking Buspar 7.5 mg po bid and PRN OTC Melatonin as directed. Pt reports depressed moods and feelings of worry as well as difficulty falling asleep have improved and she is tolerating her medications well. Pt continues to deny any SI, HI, hallucinations. Pt was previously referred to a counselor but never heard about an appointment. Discussed with pt. Will continue/refill Buspar at same dose and have pt continue PRN OTC Melatonin as directed. Pt given number to [facility] to call to schedule. Pt to f/u with me in 3 months. Will monitor.   - busPIRone (BUSPAR) 7.5 MG tablet; Take 1 tablet by mouth 2 times daily  Dispense: 60 tablet; Refill: 5    3. Insomnia, unspecified type  See # 1-2 above. 4. Chronic pain of right knee  Pt under care of [facility] for chronic right knee pain. Pt saw them most recently 3/6/23.   Had MRI 12/19/22. POA feels chronic pain related to meniscus issue. Gave pt steroid injection at appointment 1/23/23. Brought temporary relief, but symptoms starting to return-pain/edema. POA is recommending pt have a meniscus repair at this point. Pt is considering this and plans to get back to POA so she can schedule. Pt reports that she feels that PRN Naprosyn works better than PRN Mobic and would like refill on PRN Naprosyn. Discussed with pt. Will have pt continue POC per POA. Will have pt stop PRN Mobic and give refill on PRN Naprosyn per pt request. Pt to f/u with me in 3 months. Will monitor.   - naproxen (NAPROSYN) 250 MG tablet; Take 1 tablet by mouth 2 times daily as needed for Pain (take with food)  Dispense: 30 tablet; Refill: 5    5. Bilateral hand pain  6. Bilateral wrist pain  7. Numbness and tingling in both hands  Pt reports having ongoing bilat hand/wrist pain with intermittent numbness/tingling to bilat hands for past several months. Symptoms worse when sleeping at night and when trying to use hands at work. Denies any increased warmth or erythema. Denies any focal weakness to bilat hands. Pt was given Rx for bilat cock up splints for pt to wear while at work to help-pt reports wearing as directed. Pt was referred to [facility] for further evaluation of possible CTS, but never heard about an appointment. Pt reports that she feels that PRN Naprosyn works better than PRN Mobic and would like refill on PRN Naprosyn. Discussed with pt. Pt again given referral to [facility] for further evaluation of possible CTS. Will have pt continue wearing cock up splints as directed. Will have pt stop PRN Mobic and give refill on PRN Naprosyn per pt request. Pt to f/u with me in 3 months. Will monitor.   - naproxen (NAPROSYN) 250 MG tablet; Take 1 tablet by mouth 2 times daily as needed for Pain (take with food)  Dispense: 30 tablet; Refill: 5  - [address] [place] [address]    8.  Mixed hyperlipidemia  Pt reports taking Lipitor 20 mg po daily as directed and reports following heart healthy diet. On 3/6/23, pt's triglycerides improved to 177 but still elevated some. HDL was WNL 40. LDL-C was elevated and worsened some to 127.6 Discussed with pt. Will increase Lipitor to 40 mg po daily and have pt continue heart healthy diet. Will recheck fasting lipids/CMP prior to f/u with me in 3 months. Will monitor.   - atorvastatin (LIPITOR) 40 MG tablet; Take 1 tablet by mouth daily  Dispense: 30 tablet; Refill: 5  - Comprehensive Metabolic Panel; Future  - Lipid Panel; Future    9. Screening for cervical cancer  Pt reports having a hx of a partial hysterectomy. Per pt, cervix was removed and pt no longer gets PAP. 10. Screening for colon cancer  Pt had screening colonoscopy with GI-Dr. Apoorva Marquez 23. Due for repeat screening colonoscopy at 2 year point in 2025. 11. Encounter for screening mammogram for malignant neoplasm of breast  Mammogram Birads 2 benign on 22 after comparing to previous. Recommend repeat screening mammogram in 1 year point in 2023. 12. Encounter for immunization  Pt UTD on Tdap. Pt declined Covid, Flu, Shingles vaccines today. Will continue to encourage pt to get UTD on these vaccines at future appointments. Return in about 3 months (around 2023) for  To recheck chronic condtions (fasting labs prior). Call sooner for concerns. .    Increase Lipitor to 40. Recheck CMP/lipids 3 months. SUBJECTIVE/OBJECTIVE:    HPI 23-  Lethea Knife Carolann Naylor (: 1970) is a 47 y.o. female, Established patient patient, here for evaluation of the following chief complaint(s):  Follow-up (Pt here today for f/u to recheck depression, anxiety, insomnia as well as chronic MSK complaints and HLD. Pt reports  following previous POC as directed. Please see ROS/Assessment and Plan sections for full discussion/management of these issues.  Pt is Singaporean speaking and her daughter accompanied her today and served as English/. Pt has not yet had her repeat fasting lipids drawn. )    HPI today-  Radha Knott (: 1970) is a 47 y.o. female, Established patient patient, here for evaluation of the following chief complaint(s):  Follow-up (Pt here today for f/u to recheck depression, anxiety, insomnia as well as chronic MSK complaints and HLD. Pt reports taking medications/following previous POC as directed. Please see ROS/Assessment and Plan sections for full discussion/management of these issues. Pt is Kyrgyz speaking and her daughter accompanied her today and served as English/.)     No Known Allergies  [unfilled]  History reviewed. No pertinent past medical history. Past Surgical History:   Procedure Laterality Date    COLONOSCOPY N/A 2023    COLONOSCOPY POLYPECTOMY SNARE/COLD BIOPSY performed by Marlena Young MD at [facility] ENDOSCOPY    OTHER SURGICAL HISTORY      vaginal wall mesh    OTHER SURGICAL HISTORY      gum surgery    OTHER SURGICAL HISTORY      Partial hysterectomy cervix removed per pt    TUBAL LIGATION       Family History   Problem Relation Age of Onset    High Cholesterol Mother     High Blood Pressure Mother     Heart Attack Father     Diabetes Brother      Social History     Tobacco Use   Smoking Status Never   Smokeless Tobacco Never         Review of Systems   Constitutional: Negative. Negative for appetite change, chills, diaphoresis, fatigue, fever and unexpected weight change. HENT: Negative. Negative for congestion, dental problem, drooling, ear discharge, ear pain, facial swelling, hearing loss, mouth sores, nosebleeds, postnasal drip, rhinorrhea, sinus pressure, sinus pain, sneezing, sore throat, tinnitus, trouble swallowing and voice change. Eyes: Negative. Negative for pain, discharge and visual disturbance. Respiratory: Negative.   Negative for apnea, cough, choking, chest tightness, shortness of breath, wheezing and stridor. Cardiovascular: Negative. Negative for chest pain, palpitations and leg swelling. Gastrointestinal: Negative. Negative for abdominal distention, abdominal pain, anal bleeding, blood in stool, constipation, diarrhea, nausea, rectal pain and vomiting. Endocrine: Negative. Negative for cold intolerance, heat intolerance, polydipsia, polyphagia and polyuria. Genitourinary: Negative. Negative for decreased urine volume, difficulty urinating, dyspareunia, dysuria, flank pain, frequency, genital sores, hematuria, menstrual problem, pelvic pain, urgency, vaginal bleeding, vaginal discharge and vaginal pain. Musculoskeletal:  Positive for arthralgias and joint swelling. Negative for back pain, gait problem, myalgias, neck pain and neck stiffness. Pt under care of [facility] for chronic right knee pain. Pt saw them most recently 3/6/23. Had MRI 12/19/22. POA feels chronic pain related to meniscus issue. Gave pt steroid injection at appointment 1/23/23. Brought temporary relief, but symptoms starting to return-pain/edema. POA is recommending pt have a meniscus repair at this point. Pt is considering this and plans to get back to POA so she can schedule. Pt reports that she feels that PRN Naprosyn works better than PRN Mobic and would like refill on PRN Naprosyn. Pt reports having ongoing bilat hand/wrist pain with intermittent numbness/tingling to bilat hands for past several months. Symptoms worse when sleeping at night and when trying to use hands at work. Denies any increased warmth or erythema. Denies any focal weakness to bilat hands. Pt was given Rx for bilat cock up splints for pt to wear while at work to help-pt reports wearing as directed. Pt was referred to [facility] for further evaluation of possible CTS, but never heard about an appointment.  Pt reports that she feels that PRN Naprosyn works better than PRN Mobic and would like refill on PRN Naprosyn. Skin: Negative. Negative for color change, pallor, rash and wound. Allergic/Immunologic: Negative. Negative for environmental allergies. Neurological:  Positive for numbness (see MSK). Negative for dizziness, tremors, seizures, syncope, facial asymmetry, speech difficulty, weakness, light-headedness and headaches. Hematological: Negative. Negative for adenopathy. Does not bruise/bleed easily. Psychiatric/Behavioral:  Positive for dysphoric mood. Negative for hallucinations, self-injury, sleep disturbance and suicidal ideas. The patient is nervous/anxious. Pt has been having some increased depressed moods, feelings of worry, and difficulty falling asleep over past year due to the stress of caring for her  who suffers from Schizophrenia. Pt reports that her adult son and adult daughter have moved in with her to help and offer her good support. Pt reports taking Buspar 7.5 mg po bid and PRN OTC Melatonin as directed. Pt reports depressed moods and feelings of worry as well as difficulty falling asleep have improved and she is tolerating her medications well. Pt continues to deny any SI, HI, hallucinations. Pt was previously referred to a counselor but never heard about an appointment. Vitals:    03/09/23 1537   BP: 122/78   Pulse: 78   Resp: 16   SpO2: 97%       Physical Exam  Vitals reviewed. Constitutional:       General: She is not in acute distress. Appearance: Normal appearance. She is obese. She is not ill-appearing, toxic-appearing or diaphoretic. HENT:      Head: Normocephalic and atraumatic. Mouth/Throat:      Mouth: Mucous membranes are moist.      Pharynx: Oropharynx is clear. Eyes:      General: No scleral icterus. Extraocular Movements: Extraocular movements intact. Pupils: Pupils are equal, round, and reactive to light. Cardiovascular:      Rate and Rhythm: Normal rate and regular rhythm. Pulses: Normal pulses.       Heart sounds: Normal heart sounds. No murmur heard. No friction rub. No gallop. Pulmonary:      Effort: Pulmonary effort is normal. No respiratory distress. Breath sounds: Normal breath sounds. No stridor. No wheezing, rhonchi or rales. Chest:      Chest wall: No tenderness. Abdominal:      General: Abdomen is flat. Bowel sounds are normal. There is no distension. Palpations: Abdomen is soft. Tenderness: There is no abdominal tenderness. There is no guarding. Musculoskeletal:         General: Swelling and tenderness present. No deformity or signs of injury. Normal range of motion. Cervical back: Normal range of motion and neck supple. No rigidity or tenderness. Right lower leg: No edema. Left lower leg: No edema. Comments: Focused on right knee: No erythema or increased warmth noted to right knee, but does have minimal generalized tenderness and edema. ROM intact without crepitus. Cannot displace in any plane. Pt able to bear weight and ambulates with steady gait, unassisted    Focused on bilat hands/wrists: No ttp noted to bilat hands. Mild generalized ttp noted to volar surface of bilat wrists with +Tinel's sign bilat. No increased warmth, edema, erythema noted to either hand or wrist. ROM intact to bilat hands/wrists without crepitus. No focal weakness noted to either hand. Strong and equal  strength bilat. Lymphadenopathy:      Cervical: No cervical adenopathy. Skin:     General: Skin is warm. Capillary Refill: Capillary refill takes less than 2 seconds. Coloration: Skin is not jaundiced or pale. Findings: No bruising, erythema, lesion or rash. Neurological:      General: No focal deficit present. Mental Status: She is alert and oriented to person, place, and time. Cranial Nerves: No cranial nerve deficit. Sensory: No sensory deficit. Motor: No weakness.       Coordination: Coordination normal.      Gait: Gait normal. Psychiatric:         Mood and Affect: Mood normal.         Behavior: Behavior normal.         Thought Content: Thought content normal.         Judgment: Judgment normal.      Comments: Pt calm and cooperative. Makes good eye contact. Clear mentation. Answers questions appropriately.  Denies any SI, HI, hallucinations     Component      Latest Ref Rng & Units 9/23/2022           4:42 PM   Hepatitis C Ab      NONREACTIVE   NONREACTIVE     Component      Latest Ref Rng & Units 9/23/2022 9/23/2022           4:42 PM  4:42 PM   T4 Free      0.78 - 1.46 NG/DL  0.9   TSH, 3RD GENERATION      0.358 - 3.740 uIU/mL 1.870      Component      Latest Ref Rng & Units 3/6/2023 9/23/2022           9:24 AM  4:42 PM   CHOLESTEROL, TOTAL, 040536      <200 MG/ (H) 191   Triglycerides      35 - 150 MG/ (H) 287 (H)   HDL Cholesterol      40 - 60 MG/DL 40 31 (L)   LDL Calculated      <100 MG/.6 (H) 102.6 (H)   VLDL Cholesterol Calculated      6.0 - 23.0 MG/DL 35.4 (H) 57.4 (H)   Chol/HDL Ratio       5.1 6.2     Component      Latest Ref Rng & Units 10/31/2022           4:18 PM   WBC      4.3 - 11.1 K/uL 8.1   RBC      4.05 - 5.2 M/uL 4.58   Hemoglobin Quant      11.7 - 15.4 g/dL 13.2   Hematocrit      35.8 - 46.3 % 41.3   MCV      82 - 102 FL 90.2   MCH      26.1 - 32.9 PG 28.8   MCHC      31.4 - 35.0 g/dL 32.0   RDW      11.9 - 14.6 % 13.3   Platelet Count      514 - 450 K/uL 268   MPV      9.4 - 12.3 FL 11.2   Nucleated Red Blood Cells      0.0 - 0.2 K/uL 0.00   Differential Type       AUTOMATED   Seg Neutrophils      43 - 78 % 63   Lymphocytes      13 - 44 % 30   Monocytes      4.0 - 12.0 % 6   Eosinophils %      0.5 - 7.8 % 1   Basophils      0.0 - 2.0 % 0   Immature Granulocytes      0.0 - 5.0 % 0   Segs Absolute      1.7 - 8.2 K/UL 5.0   Absolute Lymph #      0.5 - 4.6 K/UL 2.4   Absolute Mono #      0.1 - 1.3 K/UL 0.5   Absolute Eos #      0.0 - 0.8 K/UL 0.1   Basophils Absolute      0.0 - 0.2 K/UL 0.0   Absolute Immature Granulocyte      0.0 - 0.5 K/UL 0.0     Component      Latest Ref Rng & Units 12/16/2022 10/31/2022          11:45 AM  4:18 PM   Sodium      133 - 143 mmol/L 142 138   Potassium      3.5 - 5.1 mmol/L 4.3 3.3 (L)   Chloride      101 - 110 mmol/L 109 104   CO2      21 - 32 mmol/L 25 28   Anion Gap      2 - 11 mmol/L 8 6   Glucose, Random      65 - 100 mg/dL 86 81   BUN,BUNPL      6 - 23 MG/DL 12 8   Creatinine      0.6 - 1.0 MG/DL 0.70 0.60   Est, Glom Filt Rate      >60 ml/min/1.73m2 >60 >60   CALCIUM, SERUM, [id number]      8.3 - 10.4 MG/DL 9.3 9.5   BILIRUBIN TOTAL      0.2 - 1.1 MG/DL  0.4   ALT      12 - 65 U/L  37   AST      15 - 37 U/L  22   Alk Phosphatase      50 - 136 U/L  121   Total Protein      6.3 - 8.2 g/dL  7.4   Albumin      3.5 - 5.0 g/dL  3.8   Globulin      2.8 - 4.5 g/dL  3.6   ALBUMIN/GLOBULIN RATIO      0.4 - 1.6    1.1     PLEASE NOTE:  This document has been produced using voice recognition software. Unrecognized errors in transcription may be present. On this date 3/9/2023 I have spent 30 minutes reviewing previous notes, test results and face to face with the patient discussing the diagnosis and importance of compliance with the treatment plan as well as documenting on the day of the visit. An electronic signature was used to authenticate this note.   -- Ras Hawk, FLOYD - NP

## 2023-03-10 DIAGNOSIS — G89.29 CHRONIC PAIN OF RIGHT KNEE: ICD-10-CM

## 2023-03-10 DIAGNOSIS — M23.300 DEGENERATIVE TEAR OF LATERAL MENISCUS OF RIGHT KNEE: Primary | ICD-10-CM

## 2023-03-10 DIAGNOSIS — M25.461 EFFUSION OF RIGHT KNEE: ICD-10-CM

## 2023-03-10 DIAGNOSIS — M25.561 CHRONIC PAIN OF RIGHT KNEE: ICD-10-CM

## 2023-03-13 ASSESSMENT — ENCOUNTER SYMPTOMS
RHINORRHEA: 0
SORE THROAT: 0
TROUBLE SWALLOWING: 0
DIARRHEA: 0
RECTAL PAIN: 0
EYES NEGATIVE: 1
WHEEZING: 0
GASTROINTESTINAL NEGATIVE: 1
BLOOD IN STOOL: 0
SINUS PRESSURE: 0
COUGH: 0
CHOKING: 0
RESPIRATORY NEGATIVE: 1
VOMITING: 0
CONSTIPATION: 0
ABDOMINAL DISTENTION: 0
APNEA: 0
ANAL BLEEDING: 0
ABDOMINAL PAIN: 0
SINUS PAIN: 0
EYE PAIN: 0
COLOR CHANGE: 0
NAUSEA: 0
SHORTNESS OF BREATH: 0
FACIAL SWELLING: 0
CHEST TIGHTNESS: 0
BACK PAIN: 0
VOICE CHANGE: 0
ALLERGIC/IMMUNOLOGIC NEGATIVE: 1
EYE DISCHARGE: 0
STRIDOR: 0

## 2023-04-05 ENCOUNTER — OFFICE VISIT (OUTPATIENT)
Dept: ORTHOPEDIC SURGERY | Age: 53
End: 2023-04-05

## 2023-04-05 DIAGNOSIS — G56.03 BILATERAL CARPAL TUNNEL SYNDROME: ICD-10-CM

## 2023-04-05 DIAGNOSIS — M18.11 ARTHRITIS OF CARPOMETACARPAL (CMC) JOINT OF RIGHT THUMB: ICD-10-CM

## 2023-04-05 DIAGNOSIS — M77.11 RIGHT TENNIS ELBOW: Primary | ICD-10-CM

## 2023-04-05 RX ORDER — BETAMETHASONE SODIUM PHOSPHATE AND BETAMETHASONE ACETATE 3; 3 MG/ML; MG/ML
6 INJECTION, SUSPENSION INTRA-ARTICULAR; INTRALESIONAL; INTRAMUSCULAR; SOFT TISSUE ONCE
Status: COMPLETED | OUTPATIENT
Start: 2023-04-05 | End: 2023-04-05

## 2023-04-05 RX ORDER — MELOXICAM 15 MG/1
15 TABLET ORAL DAILY
Qty: 60 TABLET | Refills: 0 | Status: SHIPPED | OUTPATIENT
Start: 2023-04-05 | End: 2023-06-04

## 2023-04-05 RX ORDER — BETAMETHASONE SODIUM PHOSPHATE AND BETAMETHASONE ACETATE 3; 3 MG/ML; MG/ML
12 INJECTION, SUSPENSION INTRA-ARTICULAR; INTRALESIONAL; INTRAMUSCULAR; SOFT TISSUE ONCE
Status: COMPLETED | OUTPATIENT
Start: 2023-04-05 | End: 2023-04-05

## 2023-04-05 RX ADMIN — BETAMETHASONE SODIUM PHOSPHATE AND BETAMETHASONE ACETATE 12 MG: 3; 3 INJECTION, SUSPENSION INTRA-ARTICULAR; INTRALESIONAL; INTRAMUSCULAR; SOFT TISSUE at 16:13

## 2023-04-05 RX ADMIN — BETAMETHASONE SODIUM PHOSPHATE AND BETAMETHASONE ACETATE 6 MG: 3; 3 INJECTION, SUSPENSION INTRA-ARTICULAR; INTRALESIONAL; INTRAMUSCULAR; SOFT TISSUE at 16:11

## 2023-04-05 NOTE — PATIENT INSTRUCTIONS
Patient Education        Tennis Elbow: Exercises  Introduction  Here are some examples of exercises for you to try. The exercises may be suggested for a condition or for rehabilitation. Start each exercise slowly. Ease off the exercises if you start to have pain. You will be told when to start these exercises and which ones will work best for you. How to do the exercises  Wrist flexor stretch    Extend your arm in front of you with your palm up. Bend your wrist, pointing your hand toward the floor. With your other hand, gently bend your wrist farther until you feel a mild to moderate stretch in your forearm. Hold for at least 15 to 30 seconds. Repeat 2 to 4 times. Wrist extensor stretch    Repeat steps 1 to 4 of the stretch above but begin with your extended hand palm down. Ball or sock squeeze    Hold a tennis ball (or a rolled-up sock) in your hand. Make a fist around the ball (or sock) and squeeze. Hold for about 6 seconds, and then relax for up to 10 seconds. Repeat 8 to 12 times. Switch the ball (or sock) to your other hand and do 8 to 12 times. Wrist deviation    Sit so that your arm is supported but your hand hangs off the edge of a flat surface, such as a table. Hold your hand out like you are shaking hands with someone. Move your hand up and down. Repeat this motion 8 to 12 times. Switch arms. Try to do this exercise twice with each hand. Wrist curls    Place your forearm on a table with your hand hanging over the edge of the table, palm up. Place a 1- to 2-pound weight in your hand. This may be a dumbbell, a can of food, or a filled water bottle. Slowly raise and lower the weight while keeping your forearm on the table and your palm facing up. Repeat this motion 8 to 12 times. Switch arms, and do steps 1 through 4. Repeat with your hand facing down toward the floor. Switch arms.   Biceps curls    Sit leaning forward with your legs slightly spread and your left hand on your left

## 2023-04-06 NOTE — PROGRESS NOTES
Patient was fit for a [facility] splint for patients right hand/joint. I demonstrated that the thumb slides into the opening and Velcro on the dorsal side of the hand. The strap continues around the thumb and Velcro's again on the ventral side of hand or palm, and then continues through the thumb and first finger to Velcro again on the dorsal side of hand. Patient was fitted and instructed on an  Wrist Splint for patients bilateral wrist. Patient is aware the hand slides in the brace with the thumb placed threw the thumb hole. I demonstrated the correct way to tighten the brace straps to allow for a comfortable fit. Patient understood the correct way to wear the brace. Patient read and signed documenting they understand and agree to [facility]'s current DME return policy.
MD  Orthopaedic Surgery  04/05/23  4:33 PM

## 2023-04-18 ENCOUNTER — ANESTHESIA EVENT (OUTPATIENT)
Dept: SURGERY | Age: 53
End: 2023-04-18
Payer: COMMERCIAL

## 2023-04-18 NOTE — PERIOP NOTE
Preop department called to notify patient of arrival time for scheduled procedure. Instructions given to   - Arrive at [address] - Remain NPO after midnight, unless otherwise indicated, including gum, mints, and ice chips. - Have a responsible adult to drive patient to the hospital, stay during surgery, and patient will need supervision 24 hours after anesthesia. - Use antibacterial soap in shower the night before surgery and on the morning of surgery.        Was patient contacted: yes, Johanny  Voicemail left: n/a  Numbers contacted: [phone]   Arrival time: 0600       With : Luis Chi ([id number])

## 2023-04-19 ENCOUNTER — HOSPITAL ENCOUNTER (OUTPATIENT)
Age: 53
Setting detail: OUTPATIENT SURGERY
Discharge: HOME | End: 2023-04-19
Attending: ORTHOPAEDIC SURGERY | Admitting: ORTHOPAEDIC SURGERY
Payer: COMMERCIAL

## 2023-04-19 ENCOUNTER — ANESTHESIA (OUTPATIENT)
Dept: SURGERY | Age: 53
End: 2023-04-19
Payer: COMMERCIAL

## 2023-04-19 VITALS
WEIGHT: 176 LBS | RESPIRATION RATE: 15 BRPM | SYSTOLIC BLOOD PRESSURE: 134 MMHG | DIASTOLIC BLOOD PRESSURE: 71 MMHG | HEIGHT: 63 IN | TEMPERATURE: 98 F | BODY MASS INDEX: 31.18 KG/M2 | OXYGEN SATURATION: 98 % | HEART RATE: 66 BPM

## 2023-04-19 DIAGNOSIS — M25.461 EFFUSION OF RIGHT KNEE: ICD-10-CM

## 2023-04-19 DIAGNOSIS — M23.300 DEGENERATIVE TEAR OF LATERAL MENISCUS OF RIGHT KNEE: ICD-10-CM

## 2023-04-19 PROCEDURE — 3600000014 HC SURGERY LEVEL 4 ADDTL 15MIN: Performed by: ORTHOPAEDIC SURGERY

## 2023-04-19 PROCEDURE — 6360000002 HC RX W HCPCS: Performed by: REGISTERED NURSE

## 2023-04-19 PROCEDURE — 6360000002 HC RX W HCPCS: Performed by: ORTHOPAEDIC SURGERY

## 2023-04-19 PROCEDURE — 3600000004 HC SURGERY LEVEL 4 BASE: Performed by: ORTHOPAEDIC SURGERY

## 2023-04-19 PROCEDURE — 2709999900 HC NON-CHARGEABLE SUPPLY: Performed by: ORTHOPAEDIC SURGERY

## 2023-04-19 PROCEDURE — 2500000003 HC RX 250 WO HCPCS: Performed by: REGISTERED NURSE

## 2023-04-19 PROCEDURE — 2720000010 HC SURG SUPPLY STERILE: Performed by: ORTHOPAEDIC SURGERY

## 2023-04-19 PROCEDURE — 2580000003 HC RX 258: Performed by: ANESTHESIOLOGY

## 2023-04-19 PROCEDURE — 6360000002 HC RX W HCPCS: Performed by: SPECIALIST/TECHNOLOGIST

## 2023-04-19 PROCEDURE — 7100000001 HC PACU RECOVERY - ADDTL 15 MIN: Performed by: ORTHOPAEDIC SURGERY

## 2023-04-19 PROCEDURE — 3700000001 HC ADD 15 MINUTES (ANESTHESIA): Performed by: ORTHOPAEDIC SURGERY

## 2023-04-19 PROCEDURE — 6360000002 HC RX W HCPCS: Performed by: ANESTHESIOLOGY

## 2023-04-19 PROCEDURE — 7100000010 HC PHASE II RECOVERY - FIRST 15 MIN: Performed by: ORTHOPAEDIC SURGERY

## 2023-04-19 PROCEDURE — 3700000000 HC ANESTHESIA ATTENDED CARE: Performed by: ORTHOPAEDIC SURGERY

## 2023-04-19 PROCEDURE — 7100000000 HC PACU RECOVERY - FIRST 15 MIN: Performed by: ORTHOPAEDIC SURGERY

## 2023-04-19 PROCEDURE — 6370000000 HC RX 637 (ALT 250 FOR IP): Performed by: ANESTHESIOLOGY

## 2023-04-19 PROCEDURE — 29881 ARTHRS KNE SRG MNISECTMY M/L: CPT | Performed by: ORTHOPAEDIC SURGERY

## 2023-04-19 RX ORDER — FENTANYL CITRATE 50 UG/ML
INJECTION, SOLUTION INTRAMUSCULAR; INTRAVENOUS PRN
Status: DISCONTINUED | OUTPATIENT
Start: 2023-04-19 | End: 2023-04-19 | Stop reason: SDUPTHER

## 2023-04-19 RX ORDER — DEXAMETHASONE SODIUM PHOSPHATE 4 MG/ML
INJECTION, SOLUTION INTRA-ARTICULAR; INTRALESIONAL; INTRAMUSCULAR; INTRAVENOUS; SOFT TISSUE PRN
Status: DISCONTINUED | OUTPATIENT
Start: 2023-04-19 | End: 2023-04-19 | Stop reason: SDUPTHER

## 2023-04-19 RX ORDER — OXYCODONE HYDROCHLORIDE 5 MG/1
10 TABLET ORAL PRN
Status: DISCONTINUED | OUTPATIENT
Start: 2023-04-19 | End: 2023-04-19 | Stop reason: HOSPADM

## 2023-04-19 RX ORDER — DIPHENHYDRAMINE HYDROCHLORIDE 50 MG/ML
12.5 INJECTION INTRAMUSCULAR; INTRAVENOUS
Status: DISCONTINUED | OUTPATIENT
Start: 2023-04-19 | End: 2023-04-19 | Stop reason: HOSPADM

## 2023-04-19 RX ORDER — SODIUM CHLORIDE 0.9 % (FLUSH) 0.9 %
5-40 SYRINGE (ML) INJECTION EVERY 12 HOURS SCHEDULED
Status: DISCONTINUED | OUTPATIENT
Start: 2023-04-19 | End: 2023-04-19 | Stop reason: HOSPADM

## 2023-04-19 RX ORDER — HYDROMORPHONE HYDROCHLORIDE 2 MG/ML
0.5 INJECTION, SOLUTION INTRAMUSCULAR; INTRAVENOUS; SUBCUTANEOUS EVERY 5 MIN PRN
Status: DISCONTINUED | OUTPATIENT
Start: 2023-04-19 | End: 2023-04-19 | Stop reason: HOSPADM

## 2023-04-19 RX ORDER — PROCHLORPERAZINE EDISYLATE 5 MG/ML
5 INJECTION INTRAMUSCULAR; INTRAVENOUS
Status: DISCONTINUED | OUTPATIENT
Start: 2023-04-19 | End: 2023-04-19 | Stop reason: HOSPADM

## 2023-04-19 RX ORDER — ONDANSETRON 2 MG/ML
INJECTION INTRAMUSCULAR; INTRAVENOUS PRN
Status: DISCONTINUED | OUTPATIENT
Start: 2023-04-19 | End: 2023-04-19 | Stop reason: SDUPTHER

## 2023-04-19 RX ORDER — MIDAZOLAM HYDROCHLORIDE 2 MG/2ML
2 INJECTION, SOLUTION INTRAMUSCULAR; INTRAVENOUS
Status: DISCONTINUED | OUTPATIENT
Start: 2023-04-19 | End: 2023-04-19 | Stop reason: HOSPADM

## 2023-04-19 RX ORDER — HYDROMORPHONE HYDROCHLORIDE 2 MG/ML
0.25 INJECTION, SOLUTION INTRAMUSCULAR; INTRAVENOUS; SUBCUTANEOUS EVERY 5 MIN PRN
Status: DISCONTINUED | OUTPATIENT
Start: 2023-04-19 | End: 2023-04-19 | Stop reason: HOSPADM

## 2023-04-19 RX ORDER — MIDAZOLAM HYDROCHLORIDE 1 MG/ML
INJECTION INTRAMUSCULAR; INTRAVENOUS PRN
Status: DISCONTINUED | OUTPATIENT
Start: 2023-04-19 | End: 2023-04-19 | Stop reason: SDUPTHER

## 2023-04-19 RX ORDER — PROPOFOL 10 MG/ML
INJECTION, EMULSION INTRAVENOUS PRN
Status: DISCONTINUED | OUTPATIENT
Start: 2023-04-19 | End: 2023-04-19 | Stop reason: SDUPTHER

## 2023-04-19 RX ORDER — SODIUM CHLORIDE 0.9 % (FLUSH) 0.9 %
5-40 SYRINGE (ML) INJECTION PRN
Status: DISCONTINUED | OUTPATIENT
Start: 2023-04-19 | End: 2023-04-19 | Stop reason: HOSPADM

## 2023-04-19 RX ORDER — ACETAMINOPHEN 500 MG
1000 TABLET ORAL ONCE
Status: COMPLETED | OUTPATIENT
Start: 2023-04-19 | End: 2023-04-19

## 2023-04-19 RX ORDER — ROPIVACAINE HYDROCHLORIDE 5 MG/ML
INJECTION, SOLUTION EPIDURAL; INFILTRATION; PERINEURAL PRN
Status: DISCONTINUED | OUTPATIENT
Start: 2023-04-19 | End: 2023-04-19 | Stop reason: HOSPADM

## 2023-04-19 RX ORDER — OXYCODONE HYDROCHLORIDE 5 MG/1
5 TABLET ORAL PRN
Status: DISCONTINUED | OUTPATIENT
Start: 2023-04-19 | End: 2023-04-19 | Stop reason: HOSPADM

## 2023-04-19 RX ORDER — LIDOCAINE HYDROCHLORIDE 10 MG/ML
1 INJECTION, SOLUTION INFILTRATION; PERINEURAL
Status: DISCONTINUED | OUTPATIENT
Start: 2023-04-19 | End: 2023-04-19 | Stop reason: HOSPADM

## 2023-04-19 RX ORDER — SODIUM CHLORIDE, SODIUM LACTATE, POTASSIUM CHLORIDE, CALCIUM CHLORIDE 600; 310; 30; 20 MG/100ML; MG/100ML; MG/100ML; MG/100ML
INJECTION, SOLUTION INTRAVENOUS CONTINUOUS
Status: DISCONTINUED | OUTPATIENT
Start: 2023-04-19 | End: 2023-04-19 | Stop reason: HOSPADM

## 2023-04-19 RX ORDER — SODIUM CHLORIDE 9 MG/ML
INJECTION, SOLUTION INTRAVENOUS PRN
Status: DISCONTINUED | OUTPATIENT
Start: 2023-04-19 | End: 2023-04-19 | Stop reason: HOSPADM

## 2023-04-19 RX ORDER — ONDANSETRON 2 MG/ML
4 INJECTION INTRAMUSCULAR; INTRAVENOUS
Status: DISCONTINUED | OUTPATIENT
Start: 2023-04-19 | End: 2023-04-19 | Stop reason: HOSPADM

## 2023-04-19 RX ORDER — LIDOCAINE HYDROCHLORIDE 20 MG/ML
INJECTION, SOLUTION EPIDURAL; INFILTRATION; INTRACAUDAL; PERINEURAL PRN
Status: DISCONTINUED | OUTPATIENT
Start: 2023-04-19 | End: 2023-04-19 | Stop reason: SDUPTHER

## 2023-04-19 RX ADMIN — HYDROMORPHONE HYDROCHLORIDE 0.5 MG: 2 INJECTION, SOLUTION INTRAMUSCULAR; INTRAVENOUS; SUBCUTANEOUS at 09:12

## 2023-04-19 RX ADMIN — DEXAMETHASONE SODIUM PHOSPHATE 4 MG: 4 INJECTION, SOLUTION INTRAMUSCULAR; INTRAVENOUS at 07:54

## 2023-04-19 RX ADMIN — LIDOCAINE HYDROCHLORIDE 100 MG: 20 INJECTION, SOLUTION EPIDURAL; INFILTRATION; INTRACAUDAL; PERINEURAL at 07:45

## 2023-04-19 RX ADMIN — PROPOFOL 200 MG: 10 INJECTION, EMULSION INTRAVENOUS at 07:45

## 2023-04-19 RX ADMIN — ACETAMINOPHEN 1000 MG: 500 TABLET, FILM COATED ORAL at 06:44

## 2023-04-19 RX ADMIN — MIDAZOLAM 2 MG: 1 INJECTION INTRAMUSCULAR; INTRAVENOUS at 07:38

## 2023-04-19 RX ADMIN — HYDROMORPHONE HYDROCHLORIDE 0.5 MG: 2 INJECTION, SOLUTION INTRAMUSCULAR; INTRAVENOUS; SUBCUTANEOUS at 09:17

## 2023-04-19 RX ADMIN — Medication 2000 MG: at 07:48

## 2023-04-19 RX ADMIN — SODIUM CHLORIDE, SODIUM LACTATE, POTASSIUM CHLORIDE, AND CALCIUM CHLORIDE: 600; 310; 30; 20 INJECTION, SOLUTION INTRAVENOUS at 07:33

## 2023-04-19 RX ADMIN — FENTANYL CITRATE 25 MCG: 50 INJECTION, SOLUTION INTRAMUSCULAR; INTRAVENOUS at 08:18

## 2023-04-19 RX ADMIN — FENTANYL CITRATE 25 MCG: 50 INJECTION, SOLUTION INTRAMUSCULAR; INTRAVENOUS at 08:23

## 2023-04-19 RX ADMIN — FENTANYL CITRATE 50 MCG: 50 INJECTION, SOLUTION INTRAMUSCULAR; INTRAVENOUS at 07:56

## 2023-04-19 RX ADMIN — ONDANSETRON 4 MG: 2 INJECTION INTRAMUSCULAR; INTRAVENOUS at 07:54

## 2023-04-19 ASSESSMENT — PAIN DESCRIPTION - LOCATION
LOCATION: KNEE
LOCATION: KNEE

## 2023-04-19 ASSESSMENT — PAIN - FUNCTIONAL ASSESSMENT: PAIN_FUNCTIONAL_ASSESSMENT: 0-10

## 2023-04-19 ASSESSMENT — PAIN DESCRIPTION - ORIENTATION
ORIENTATION: RIGHT
ORIENTATION: RIGHT

## 2023-04-19 ASSESSMENT — PAIN SCALES - GENERAL
PAINLEVEL_OUTOF10: 0
PAINLEVEL_OUTOF10: 8
PAINLEVEL_OUTOF10: 6
PAINLEVEL_OUTOF10: 0
PAINLEVEL_OUTOF10: 7
PAINLEVEL_OUTOF10: 3
PAINLEVEL_OUTOF10: 0
PAINLEVEL_OUTOF10: 3
PAINLEVEL_OUTOF10: 3

## 2023-04-19 ASSESSMENT — PAIN DESCRIPTION - DESCRIPTORS: DESCRIPTORS: ACHING;THROBBING

## 2023-04-19 NOTE — OP NOTE
Operative Report    Patient: Erwin Bell MRN: [id number]  SSN: [id number]    YOB: 1970  Age: 47 y.o. Sex: female       Date of Surgery: 4/19/2023     Preoperative Diagnosis: right  Knee lateral Meniscus tear  Right knee synovitis    Postoperative Diagnosis: Same    Surgeon(s) and Role:     * Dagoberto Drain, MD - Primary    Anesthesia: General     Procedure:    1) right  Knee arthroscopy with partial lateral meniscectomy/debridement  2) right knee arthroscopy with partial synovectomy      Estimated Blood Loss:  5 mL    Tourniquet Time:   Total Tourniquet Time Documented:  Thigh (Right) - 24 minutes  Total: Thigh (Right) - 24 minutes          Implants:   None           Specimens: * No specimens in log *        Drains: None                Complications: None    Counts: Sponge and needle counts were correct times two. Indications: See H&P      Findings:  Large lateral meniscus tear of the junction of mid body and anterior horn with a full-thickness radial component with instability chronic degenerative tear. Small focal collection of synovitis in the posterior compartment of the knee posterior to the PCL    Procedure in Detail: After informed consent was obtained the surgical site was marked in the preoperative area by myself the surgeon. Patient was brought to the operating room placed supine the operating table general anesthesia was induced. The operative extremity was prepped and draped in usual orthopedic sterile fashion timeout was performed per protocol. Antibiotics were given per protocol. Initially a standard inferolateral arthroscopy portal was established. Diagnostic arthroscopy was carried out. Suprapatellar pouch was benign. Patellofemoral compartment demonstrated mild degenerative changes of the articular surfaces of the undersurface of the patella and  of the trochlea. Medial and lateral gutters were free of loose bodies.   Anteromedial portal was established

## 2023-04-19 NOTE — PROGRESS NOTES
Patient/caregivers speak Fijian as their preferred language for their healthcare communication. For safe communication, use the [employer]  carts or call:    Charles Delgado Senior -Navigator ([phone])  General phone: [phone] ( [phone])  Email: [email or web address]    Always document the use of interpreting services ('s ID number) in your clinical notes. Our interpreters are available for team members working with limited  [place] proficient (LEP) patients remotely, via phone or video or in person (if needed for special cases). When using family members to interpret, for the safety of the patient and protection of the communication of both our patient and [facility] staff the VRI or telephonic  should remain on the line to monitor that all communication is accurate and complete. The  should be instructed to notify [facility] staff immediately if there are any inaccuracies.          Thank you,        Charles ALVAREZ  Senior /Navigator

## 2023-04-19 NOTE — ANESTHESIA PRE PROCEDURE
Department of Anesthesiology  Preprocedure Note       Name:  Simeon Garcia   Age:  47 y.o.  :  1970                                          MRN:  [id number]         Date:  2023      Surgeon: Pattie Paez):  Kenyetta Stuart MD    Procedure: Procedure(s):  RIGHT KNEE ARTHROSCOPY MAJOR SYNOVECTOMY, MEDIAL AND LATERAL MENISECTOMY    Medications prior to admission:   Prior to Admission medications    Medication Sig Start Date End Date Taking? Authorizing Provider   aspirin EC 81 MG EC tablet Take 1 tablet by mouth daily 23   Salvador Rad, APRN - CNP   meloxicam MENDEZ IVAN [facility]) 15 MG tablet Take 1 tablet by mouth daily 23  Katja Salinas MD   atorvastatin (LIPITOR) 40 MG tablet Take 1 tablet by mouth daily 3/9/23   Phoenix Butter, APRN - NP   naproxen (NAPROSYN) 250 MG tablet Take 1 tablet by mouth 2 times daily as needed for Pain (take with food) 3/9/23   Phoenix Butter APRN - NP   busPIRone (BUSPAR) 7.5 MG tablet Take 1 tablet by mouth 2 times daily 3/9/23   Phoenix Butter, APRN - NP       Current medications:    No current facility-administered medications for this visit. No current outpatient medications on file.      Facility-Administered Medications Ordered in Other Visits   Medication Dose Route Frequency Provider Last Rate Last Admin    sodium chloride flush 0.9 % injection 5-40 mL  5-40 mL IntraVENous 2 times per day Salvador Rad, APRN - CNP        sodium chloride flush 0.9 % injection 5-40 mL  5-40 mL IntraVENous PRN Salvador Rad, APRN - CNP        0.9 % sodium chloride infusion   IntraVENous PRN Salvador Rad, APRN - CNP        ceFAZolin (ANCEF) 2000 mg in sterile water 20 mL IV syringe  2,000 mg IntraVENous On Call to [address] [place] [address], APRN - CNP        lidocaine 1 % injection 1 mL  1 mL IntraDERmal Once PRN Mikey Weaver IV, MD        lactated ringers IV soln infusion   IntraVENous Continuous Mikey Weaver IV, MD        sodium chloride flush 0.9 %

## 2023-04-19 NOTE — DISCHARGE INSTRUCTIONS
Post-op instructions for Knee Arthroscopy (Dr. Qiana Ayala)    Day of Surgery:     DIET:   Begin with liquids and light foods (jell-o, soup, etc.). Progress to your normal diet if you are not nauseated. MEDICATION:   1. Pain- Norco (hydrocodone/acetaminophen) or Oxycodone. If you are taking oxycodone, you may also take two (2) Tylenol (acetaminophen) 500mg tabs every eight (8) hours. Do not take Tylenol (acetaminophen) if you are given Norco as this medicine already contains acetaminophen. Do not drink alcohol while taking pain medication. Slowly wean off the pain medication as the pain improves. 2. Aspirin 81 mg: Take one (1) tablet in the morning and at night each day x 2 weeks post-operatively. 3. Stool Softener-Pain medication can cause constipation. Be sure to drink plenty of water and take an over the counter stool softener as needed. ICE:  Do NOT put the ice machine directly on your skin. Use it frequently for the first 72 hours. You may also use a regular ice bag/pack for 20 minutes at a time. Place a thin layer of clothing or pillow case between ice pack and your skin. Ice for 20-30 minutes on and then 20-30 minutes off. If you have the Surgical dressing intact you may run your ice machine for as long as as comfortable as long as your skin is not irritated/burned. SHOWERING:  No showering. Leave bandages in place. ACTIVITY:  Keep leg elevated on a pillow placed under your ankle. **DO NOT PUT A PILLOW UNDER YOUR KNEE!!**  It is important for you to keep your leg straight. Use crutches and you may put light weight on the operative leg. EXERCISES:  Begin ankle pumps. Attempt quadriceps sets and straight leg raises    First & Second Post-Op Day:    MEDICATION: Continue as instructed above. BANDAGES: No showering. Keep bandage in place. EXERCISES: Continue Quad sets and ankle pumps as above. Bend and extend the knee as tolerated. You may put light weight on the leg.  Continue to

## 2023-04-19 NOTE — INTERVAL H&P NOTE
Update History & Physical    The Patient's History and Physical was reviewed   I discussed the surgery and patients medical condition with the patient. The chart was reviewed with the patient and I examined the patient. There was no change from previous note. The surgical site was confirmed by the patient and me. CV: RRR  RESP: CTAB    Plan:  The risk, benefits, expected outcome, and alternative to the recommended procedure have been discussed with the patient. Patient understands and elects to proceed with the procedure as planned.     Electronically signed by Michaela Ricketts MD on 04/19/23 7:01 AM

## 2023-04-19 NOTE — ANESTHESIA PROCEDURE NOTES
Airway  Date/Time: 4/19/2023 7:47 AM  Urgency: elective    Airway not difficult    General Information and Staff    Patient location during procedure: OR  Resident/CRNA: FLOYD Singh - CRNA  Performed: resident/CRNA     Indications and Patient Condition  Indications for airway management: anesthesia  Spontaneous ventilation: present  Sedation level: deep  Preoxygenated: yes  Patient position: sniffing  MILS not maintained throughout  Mask difficulty assessment: vent by bag mask    Final Airway Details  Final airway type: supraglottic airway      Successful airway: oropharyngeal  Size 4     Number of attempts at approach: 1  Ventilation between attempts: supraglottic airway  Number of other approaches attempted: 0    no

## 2023-04-21 NOTE — PROGRESS NOTES
Spiritual Care visit. Initial Visit, Pre Surgery Consult. Visit and prayer before patient goes to surgery.     Visit by Favio Yen M.Ed., Th.B. ,Staff

## 2023-05-02 ENCOUNTER — OFFICE VISIT (OUTPATIENT)
Dept: ORTHOPEDIC SURGERY | Age: 53
End: 2023-05-02

## 2023-05-02 ENCOUNTER — CLINICAL DOCUMENTATION (OUTPATIENT)
Dept: ORTHOPEDIC SURGERY | Age: 53
End: 2023-05-02

## 2023-05-02 DIAGNOSIS — M23.300 DEGENERATIVE TEAR OF LATERAL MENISCUS OF RIGHT KNEE: ICD-10-CM

## 2023-05-02 DIAGNOSIS — Z09 S/P ORTHOPEDIC SURGERY, FOLLOW-UP EXAM: Primary | ICD-10-CM

## 2023-05-02 PROCEDURE — 99024 POSTOP FOLLOW-UP VISIT: CPT | Performed by: ORTHOPAEDIC SURGERY

## 2023-05-02 NOTE — PROGRESS NOTES
Name: Isabell Wing  YOB: 1970  Gender: female  MRN: [id number]      Procedure: Right Knee Arthroscopy Major Synovectomy,  Lateral Menisectomy - Right    Surgery Date: 4/19/2023          Subjective: She is 13 days s/p of a knee arthroscopy. Pain is controlled improving with motion. She has yet to attend formal physical therapy and continues ambulating with crutches      Physical Examination: Incisions are healing well. Able to activate quad but has appropriate atrophy. Passive extension to about 0 degrees of hyperextension flexion to 90 degrees. Patella is mobile. Moderate appropriate effusion. Motor and sensory intact. Assessment:   1. S/P orthopedic surgery, follow-up exam    2. Degenerative tear of lateral meniscus of right knee           Plan:     Advised ice and compression therapy. I voiced my concern with the patient that she has not started formal physical therapy yet and I recommend she start as soon as possible. Physical therapy will transition her off of the crutches and work on her range of motion. We will discuss her return to work at her next visit as she wishes to return to work 6 weeks after surgery.     Follow up: 4 weeks    Tyra Harris NP dictating as a scribe for Phillip Farris MD

## 2023-05-03 ENCOUNTER — EVALUATION (OUTPATIENT)
Age: 53
End: 2023-05-03
Payer: COMMERCIAL

## 2023-05-03 DIAGNOSIS — R29.898 IMPAIRED STRENGTH OF LOWER EXTREMITY: ICD-10-CM

## 2023-05-03 DIAGNOSIS — R68.89 DECREASED ACTIVITY TOLERANCE: ICD-10-CM

## 2023-05-03 DIAGNOSIS — Z74.09 IMPAIRED FUNCTIONAL MOBILITY, BALANCE, GAIT, AND ENDURANCE: ICD-10-CM

## 2023-05-03 DIAGNOSIS — M25.469 KNEE SWELLING: ICD-10-CM

## 2023-05-03 DIAGNOSIS — Z78.9 DECREASED ACTIVITIES OF DAILY LIVING (ADL): ICD-10-CM

## 2023-05-03 DIAGNOSIS — M25.661 DECREASED RANGE OF MOTION OF RIGHT KNEE: ICD-10-CM

## 2023-05-03 DIAGNOSIS — M25.561 RIGHT KNEE PAIN, UNSPECIFIED CHRONICITY: Primary | ICD-10-CM

## 2023-05-03 DIAGNOSIS — M23.300 DEGENERATIVE TEAR OF LATERAL MENISCUS OF RIGHT KNEE: ICD-10-CM

## 2023-05-03 PROCEDURE — 97110 THERAPEUTIC EXERCISES: CPT | Performed by: PHYSICAL THERAPIST

## 2023-05-03 PROCEDURE — 97162 PT EVAL MOD COMPLEX 30 MIN: CPT | Performed by: PHYSICAL THERAPIST

## 2023-05-03 NOTE — PROGRESS NOTES
reps      CLINICAL DECISION MAKING/ASSESSMENT     PERSONAL FACTORS/CO-MORBIDITIES AFFECTING POC (1-2 Medium/3+High):   age  body measurements  language  profession   PROBLEM LIST: (1-2 Low/ 3 Medium/ 4+ High):  Pain  Compromised skin integrity  Localized swelling/edema  ROM limitations  Strength deficits  Motor control deficits  Impaired gait  Impaired balance/proprioception  Decreased endurance/activity Tolerance  ADL/functional limitations/modifications  Limited work/occupational capacity  Restricted social activity  Restricted recreational participation  Decreased Knowledge of Precautions  Decreased [place] with Home Exercise Program    CLINICAL DECISION MAKING:   moderate complexity with questionable prediction of expectations and future outcomes which may require adjustments to the POC. PROGNOSIS:   good   Benefits and precautions of treatment explained to patient. Silvestre Al is a 47 y.o. female who presents to therapy today with evolving/changing clinical presentation (moderate complexity)  related to R knee arthroscopy. Pt would benefit from skilled physical therapy services to address the deficits noted above for return to prior level of function. PLAN OF CARE     EFFECTIVE DATES: 5/3/2023 TO 6/15/2023  (42 days).     FREQUENCY/DURATION: 1 - 2x/wk for 42 Day(s)  INTERVENTIONS MAY INCLUDE BUT ARE NOT LIMITED TO: (36934) Therapeutic exercise to develop ROM, strength, endurance and flexibility  (11654) Therapeutic activities using dynamic activities to improve function  (76996) Gait training to address mechanics, proper step length and weight shifting to improve household and community mobility as well as overall safety with ADLs  (51404) Manual therapy techniques to improve joint and/or soft tissue mobility, ROM, and function as well as helping to decrease pain/spasms and swelling  (10984) Neuromuscular reeducation addressing impaired balance, coordination, kinesthetic sense, posture and

## 2023-05-08 ENCOUNTER — TREATMENT (OUTPATIENT)
Age: 53
End: 2023-05-08
Payer: COMMERCIAL

## 2023-05-08 DIAGNOSIS — Z74.09 IMPAIRED FUNCTIONAL MOBILITY, BALANCE, GAIT, AND ENDURANCE: ICD-10-CM

## 2023-05-08 DIAGNOSIS — M25.561 RIGHT KNEE PAIN, UNSPECIFIED CHRONICITY: Primary | ICD-10-CM

## 2023-05-08 DIAGNOSIS — M25.661 DECREASED RANGE OF MOTION OF RIGHT KNEE: ICD-10-CM

## 2023-05-08 DIAGNOSIS — Z78.9 DECREASED ACTIVITIES OF DAILY LIVING (ADL): ICD-10-CM

## 2023-05-08 DIAGNOSIS — M25.469 KNEE SWELLING: ICD-10-CM

## 2023-05-08 DIAGNOSIS — R29.898 IMPAIRED STRENGTH OF LOWER EXTREMITY: ICD-10-CM

## 2023-05-08 DIAGNOSIS — R68.89 DECREASED ACTIVITY TOLERANCE: ICD-10-CM

## 2023-05-08 PROCEDURE — 97110 THERAPEUTIC EXERCISES: CPT | Performed by: PHYSICAL THERAPIST

## 2023-05-08 NOTE — PROGRESS NOTES
[facility]TH[facility]ED[facility]                                                       [address] [place] [address]                                                         Dept: [phone]                                   PHYSICAL THERAPY DAILY NOTE     DATE of EVAL: 5/3/23    CURRENT POC ENDS: 6/15/23    DATE of LAST PROGRESS NOTE: ---    DATE of SURGERY: Wed. 4/19/23  WEEKS POST OP: 2+    DATE of NEXT MD/PROVIDER APPT: --    PT SESSION: 2 of --    TOTAL TIMED PROCEDURE CODES: 40 MIN. TOTAL TIME: 40 MIN. REFERRING PROVIDER: FLOYD Ramos  DIAGNOSIS:     ICD-10-CM    1. Right knee pain, unspecified chronicity  M25.561       2. Knee swelling  M25.469       3. Decreased range of motion of right knee  M25.661       4. Impaired strength of lower extremity  R29.898       5. Impaired functional mobility, balance, gait, and endurance  Z74.09       6. Decreased activities of daily living (ADL)  Z78.9       7. Decreased activity tolerance  R68.89            MEDICATIONS: Reviewed in chart. ALLERGIES: No Known Allergies     THERAPY PRECAUTIONS: None. CO - MORBIDITIES AFFECTING POC: None.      PERTINENT MEDICAL HISTORY      Past Medical History:   Diagnosis Date    Anxiety     managed eith medication    Arthritis     rt thumb    COVID-19 2022    not hospitalized    PONV (postoperative nausea and vomiting)       Past Surgical History:   Procedure Laterality Date    COLONOSCOPY N/A 1/30/2023    COLONOSCOPY POLYPECTOMY SNARE/COLD BIOPSY performed by Tyler Bae MD at [facility] ENDOSCOPY    KNEE ARTHROSCOPY Right 4/19/2023    RIGHT KNEE ARTHROSCOPY MAJOR SYNOVECTOMY,  LATERAL MENISECTOMY performed by John Newby MD at [place] [address]      vaginal wall mesh    OTHER SURGICAL HISTORY      gum surgery    OTHER

## 2023-05-11 ENCOUNTER — TREATMENT (OUTPATIENT)
Age: 53
End: 2023-05-11

## 2023-05-11 DIAGNOSIS — R68.89 DECREASED ACTIVITY TOLERANCE: ICD-10-CM

## 2023-05-11 DIAGNOSIS — M25.661 DECREASED RANGE OF MOTION OF RIGHT KNEE: ICD-10-CM

## 2023-05-11 DIAGNOSIS — Z78.9 DECREASED ACTIVITIES OF DAILY LIVING (ADL): ICD-10-CM

## 2023-05-11 DIAGNOSIS — Z74.09 IMPAIRED FUNCTIONAL MOBILITY, BALANCE, GAIT, AND ENDURANCE: ICD-10-CM

## 2023-05-11 DIAGNOSIS — M25.469 KNEE SWELLING: ICD-10-CM

## 2023-05-11 DIAGNOSIS — M25.561 RIGHT KNEE PAIN, UNSPECIFIED CHRONICITY: Primary | ICD-10-CM

## 2023-05-11 DIAGNOSIS — R29.898 IMPAIRED STRENGTH OF LOWER EXTREMITY: ICD-10-CM

## 2023-05-11 NOTE — PROGRESS NOTES
[facility] ORTHOPAEDICS                                                       [address] [place] [address]                                                         Dept: [phone]                                   PHYSICAL THERAPY DAILY NOTE     DATE of EVAL: 5/3/23    CURRENT POC ENDS: 6/15/23    DATE of LAST PROGRESS NOTE: ---    DATE of SURGERY: Wed. 4/19/23  WEEKS POST OP: 3    DATE of NEXT MD/PROVIDER APPT: --    PT SESSION: 3 of --    TOTAL TIMED PROCEDURE CODES: 40 MIN. TOTAL TIME: 50 MIN. REFERRING PROVIDER: LFOYD Marie  DIAGNOSIS:     ICD-10-CM    1. Right knee pain, unspecified chronicity  M25.561       2. Knee swelling  M25.469       3. Decreased range of motion of right knee  M25.661       4. Impaired strength of lower extremity  R29.898       5. Impaired functional mobility, balance, gait, and endurance  Z74.09       6. Decreased activities of daily living (ADL)  Z78.9       7. Decreased activity tolerance  R68.89            MEDICATIONS: Reviewed in chart. ALLERGIES: No Known Allergies     THERAPY PRECAUTIONS: None. CO - MORBIDITIES AFFECTING POC: None.      PERTINENT MEDICAL HISTORY      Past Medical History:   Diagnosis Date    Anxiety     managed eith medication    Arthritis     rt thumb    COVID-19 2022    not hospitalized    PONV (postoperative nausea and vomiting)       Past Surgical History:   Procedure Laterality Date    COLONOSCOPY N/A 1/30/2023    COLONOSCOPY POLYPECTOMY SNARE/COLD BIOPSY performed by Yvan Acuna MD at [facility] ENDOSCOPY    KNEE ARTHROSCOPY Right 4/19/2023    RIGHT KNEE ARTHROSCOPY MAJOR SYNOVECTOMY,  LATERAL MENISECTOMY performed by Luci Hernandez MD at [place] [address]      vaginal wall mesh    OTHER SURGICAL HISTORY      gum surgery    OTHER

## 2023-05-15 ENCOUNTER — TREATMENT (OUTPATIENT)
Age: 53
End: 2023-05-15
Payer: COMMERCIAL

## 2023-05-15 DIAGNOSIS — Z78.9 DECREASED ACTIVITIES OF DAILY LIVING (ADL): ICD-10-CM

## 2023-05-15 DIAGNOSIS — R29.898 IMPAIRED STRENGTH OF LOWER EXTREMITY: ICD-10-CM

## 2023-05-15 DIAGNOSIS — Z74.09 IMPAIRED FUNCTIONAL MOBILITY, BALANCE, GAIT, AND ENDURANCE: ICD-10-CM

## 2023-05-15 DIAGNOSIS — R68.89 DECREASED ACTIVITY TOLERANCE: ICD-10-CM

## 2023-05-15 DIAGNOSIS — M25.561 RIGHT KNEE PAIN, UNSPECIFIED CHRONICITY: Primary | ICD-10-CM

## 2023-05-15 DIAGNOSIS — M25.469 KNEE SWELLING: ICD-10-CM

## 2023-05-15 DIAGNOSIS — M25.661 DECREASED RANGE OF MOTION OF RIGHT KNEE: ICD-10-CM

## 2023-05-15 PROCEDURE — 97016 VASOPNEUMATIC DEVICE THERAPY: CPT | Performed by: PHYSICAL THERAPIST

## 2023-05-15 PROCEDURE — 97110 THERAPEUTIC EXERCISES: CPT | Performed by: PHYSICAL THERAPIST

## 2023-05-15 NOTE — PROGRESS NOTES
[facility]THOPAED[facility]                                                       [address]                                                          Dept: [phone]                                   PHYSICAL THERAPY DAILY NOTE     DATE of EVAL: 5/3/23    CURRENT POC ENDS: 6/15/23    DATE of LAST PROGRESS NOTE: ---    DATE of SURGERY: Wed. 4/19/23  WEEKS POST OP: 3+    DATE of NEXT MD/PROVIDER APPT: 5/30/23 w STW    PT SESSION: 4 of --    TOTAL TIMED PROCEDURE CODES: 40 MIN. TOTAL TIME: 55 MIN. REFERRING PROVIDER: FLOYD Alba  DIAGNOSIS:     ICD-10-CM    1. Right knee pain, unspecified chronicity  M25.561       2. Impaired strength of lower extremity  R29.898       3. Decreased activity tolerance  R68.89       4. Knee swelling  M25.469       5. Impaired functional mobility, balance, gait, and endurance  Z74.09       6. Decreased range of motion of right knee  M25.661       7. Decreased activities of daily living (ADL)  Z78.9              MEDICATIONS: Reviewed in chart. ALLERGIES: No Known Allergies     THERAPY PRECAUTIONS: None. CO - MORBIDITIES AFFECTING POC: None.      PERTINENT MEDICAL HISTORY      Past Medical History:   Diagnosis Date    Anxiety     managed eith medication    Arthritis     rt thumb    COVID-19 2022    not hospitalized    PONV (postoperative nausea and vomiting)       Past Surgical History:   Procedure Laterality Date    COLONOSCOPY N/A 1/30/2023    COLONOSCOPY POLYPECTOMY SNARE/COLD BIOPSY performed by Catalina Gomez MD at [facility] ENDOSCOPY    KNEE ARTHROSCOPY Right 4/19/2023    RIGHT KNEE ARTHROSCOPY MAJOR SYNOVECTOMY,  LATERAL MENISECTOMY performed by Danielle Castanon MD at [place] [address]      vaginal wall mesh    OTHER SURGICAL HISTORY      gum

## 2023-05-18 ENCOUNTER — TREATMENT (OUTPATIENT)
Age: 53
End: 2023-05-18

## 2023-05-18 DIAGNOSIS — Z74.09 IMPAIRED FUNCTIONAL MOBILITY, BALANCE, GAIT, AND ENDURANCE: ICD-10-CM

## 2023-05-18 DIAGNOSIS — R29.898 IMPAIRED STRENGTH OF LOWER EXTREMITY: ICD-10-CM

## 2023-05-18 DIAGNOSIS — R68.89 DECREASED ACTIVITY TOLERANCE: ICD-10-CM

## 2023-05-18 DIAGNOSIS — M25.469 KNEE SWELLING: ICD-10-CM

## 2023-05-18 DIAGNOSIS — Z78.9 DECREASED ACTIVITIES OF DAILY LIVING (ADL): ICD-10-CM

## 2023-05-18 DIAGNOSIS — M25.661 DECREASED RANGE OF MOTION OF RIGHT KNEE: ICD-10-CM

## 2023-05-18 DIAGNOSIS — M25.561 RIGHT KNEE PAIN, UNSPECIFIED CHRONICITY: Primary | ICD-10-CM

## 2023-05-18 NOTE — PROGRESS NOTES
STATED GOALS: Resolve knee complaints and return to full activity. SUBJECTIVE     Pt reports:  Continue to have discomfort w standing or walking too long. Still have pain, discomfort about the inside of the knee. OBJECTIVE     FINDINGS:  INSPECTION: Moderate swelling about the R knee. PAIN RATING (0 - 10) 5/8/23 5/15/23   PRE 3 3   POST         GAIT: No assistive devices. Full extension and appropriate knee flexion during gait. Moves slowly. Mild antalgia. PALPATION: Tender along the medial joint line and the supramedial patella. TREATMENT PROVIDED TODAY:      51047 - THERAPEUTIC EXERCISE: 40 MIN. To address pain and deficits related to ROM and mm function - force production, endurance, neuromuscular coordination for completing ADLs. EXERCISES:  Bridging: 3 set(s): 90 sec - 90 sec - 60 sec. Varying hip and knee flexion w each set. R quad sets (QS): 1 set(s): 10 sec hold/1 min. R QS+SLR: 3 set(s): 1#/10. 2#/10 - 10. Cuff weight placed above the knee. R hip abduction: 3 set(s): 60 sec. Gr R: Loop above the knee. R hip adduction: 1 set(s): 15.   R knee extension: 1 set(s): Or R: Loop: 2 min. R knee flexion: 2 set(s): Or R: Loop: 2 min.    79268 - VASOPNEUMATIC DEVICE w TEMPERATURE SET @ 34°: 15 MIN. Game Ready to the R knee w the LE elevated for management of pain and swelling. ASSESSMENT     Patient reports/demonstrates the following improvements:   ---    The following impairments are noted:  R knee tenderness to palpation. R knee pain - stable @ 3/10. R knee swelling - mod. Gait - stairs, uneven terrain. These impairments contribute to the following functional limitations:  Wbing ADLs - per LEFS and patient report:  prolonged standing, walking, running, squatting, and heavy housework; work, hobbies, heavy housework, walking > 2 blocks, running or hopping. .    PT GOAL(S) ATTAINED:  5/18/23  Patient will voice understanding of symptom limited activity principle.   Patient will report

## 2023-05-24 ENCOUNTER — TELEPHONE (OUTPATIENT)
Dept: ORTHOPEDIC SURGERY | Age: 53
End: 2023-05-24

## 2023-05-24 NOTE — TELEPHONE ENCOUNTER
Needs . Tried calling her daughter, Francisca May, regarding STW OOO on 5/30/23. LVM asking to call back and reschedule.

## 2023-05-26 ENCOUNTER — TREATMENT (OUTPATIENT)
Age: 53
End: 2023-05-26

## 2023-05-26 DIAGNOSIS — M25.661 DECREASED RANGE OF MOTION OF RIGHT KNEE: ICD-10-CM

## 2023-05-26 DIAGNOSIS — M25.561 RIGHT KNEE PAIN, UNSPECIFIED CHRONICITY: Primary | ICD-10-CM

## 2023-05-26 DIAGNOSIS — Z74.09 IMPAIRED FUNCTIONAL MOBILITY, BALANCE, GAIT, AND ENDURANCE: ICD-10-CM

## 2023-05-26 DIAGNOSIS — Z78.9 DECREASED ACTIVITIES OF DAILY LIVING (ADL): ICD-10-CM

## 2023-05-26 DIAGNOSIS — R29.898 IMPAIRED STRENGTH OF LOWER EXTREMITY: ICD-10-CM

## 2023-05-26 DIAGNOSIS — M25.469 KNEE SWELLING: ICD-10-CM

## 2023-05-26 DIAGNOSIS — R68.89 DECREASED ACTIVITY TOLERANCE: ICD-10-CM

## 2023-06-02 ENCOUNTER — TREATMENT (OUTPATIENT)
Age: 53
End: 2023-06-02

## 2023-06-02 DIAGNOSIS — M25.661 DECREASED RANGE OF MOTION OF RIGHT KNEE: ICD-10-CM

## 2023-06-02 DIAGNOSIS — R29.898 IMPAIRED STRENGTH OF LOWER EXTREMITY: ICD-10-CM

## 2023-06-02 DIAGNOSIS — Z74.09 IMPAIRED FUNCTIONAL MOBILITY, BALANCE, GAIT, AND ENDURANCE: ICD-10-CM

## 2023-06-02 DIAGNOSIS — M25.469 KNEE SWELLING: ICD-10-CM

## 2023-06-02 DIAGNOSIS — M25.561 RIGHT KNEE PAIN, UNSPECIFIED CHRONICITY: Primary | ICD-10-CM

## 2023-06-02 DIAGNOSIS — R68.89 DECREASED ACTIVITY TOLERANCE: ICD-10-CM

## 2023-06-02 DIAGNOSIS — Z78.9 DECREASED ACTIVITIES OF DAILY LIVING (ADL): ICD-10-CM

## 2023-06-02 NOTE — PROGRESS NOTES
[facility] ORTHOPAEDICS                                                       [address]                                                          Dept: [phone]                                   PHYSICAL THERAPY DAILY NOTE     DATE of EVAL: 5/3/23    CURRENT POC ENDS: 6/15/23    DATE of LAST PROGRESS NOTE: ---    DATE of SURGERY: Wed. 4/19/23  WEEKS POST OP: 6  DATE of NEXT MD/PROVIDER APPT: 5/30/23 w STW    PT SESSION: 7 of 60    TOTAL TIMED PROCEDURE CODES: 39 MIN. TOTAL TIME: 45 MIN. REFERRING PROVIDER: FLOYD Thorpe  DIAGNOSIS:     ICD-10-CM    1. Right knee pain, unspecified chronicity  M25.561       2. Knee swelling  M25.469       3. Decreased activity tolerance  R68.89       4. Impaired strength of lower extremity  R29.898       5. Impaired functional mobility, balance, gait, and endurance  Z74.09       6. Decreased range of motion of right knee  M25.661       7. Decreased activities of daily living (ADL)  Z78.9         MEDICATIONS: Reviewed in chart. ALLERGIES: No Known Allergies     THERAPY PRECAUTIONS: None. CO - MORBIDITIES AFFECTING POC: None.      PERTINENT MEDICAL HISTORY      Past Medical History:   Diagnosis Date    Anxiety     managed eith medication    Arthritis     rt thumb    COVID-19 2022    not hospitalized    PONV (postoperative nausea and vomiting)       Past Surgical History:   Procedure Laterality Date    COLONOSCOPY N/A 1/30/2023    COLONOSCOPY POLYPECTOMY SNARE/COLD BIOPSY performed by Gavino Langston MD at [facility] ENDOSCOPY    KNEE ARTHROSCOPY Right 4/19/2023    RIGHT KNEE ARTHROSCOPY MAJOR SYNOVECTOMY,  LATERAL MENISECTOMY performed by López Dumas MD at [place] [address]      vaginal wall mesh    OTHER SURGICAL HISTORY      gum surgery

## 2023-06-08 ENCOUNTER — NURSE ONLY (OUTPATIENT)
Dept: FAMILY MEDICINE CLINIC | Facility: CLINIC | Age: 53
End: 2023-06-08

## 2023-06-08 ENCOUNTER — PROCEDURE VISIT (OUTPATIENT)
Dept: PHYSICAL MEDICINE AND REHAB | Age: 53
End: 2023-06-08
Payer: COMMERCIAL

## 2023-06-08 ENCOUNTER — TREATMENT (OUTPATIENT)
Age: 53
End: 2023-06-08

## 2023-06-08 VITALS
HEART RATE: 95 BPM | WEIGHT: 176 LBS | HEIGHT: 63 IN | SYSTOLIC BLOOD PRESSURE: 116 MMHG | DIASTOLIC BLOOD PRESSURE: 77 MMHG | BODY MASS INDEX: 31.18 KG/M2

## 2023-06-08 DIAGNOSIS — Z74.09 IMPAIRED FUNCTIONAL MOBILITY, BALANCE, GAIT, AND ENDURANCE: ICD-10-CM

## 2023-06-08 DIAGNOSIS — M25.661 DECREASED RANGE OF MOTION OF RIGHT KNEE: ICD-10-CM

## 2023-06-08 DIAGNOSIS — E78.2 MIXED HYPERLIPIDEMIA: ICD-10-CM

## 2023-06-08 DIAGNOSIS — M25.469 KNEE SWELLING: ICD-10-CM

## 2023-06-08 DIAGNOSIS — R20.2 NUMBNESS AND TINGLING IN BOTH HANDS: Primary | ICD-10-CM

## 2023-06-08 DIAGNOSIS — Z78.9 DECREASED ACTIVITIES OF DAILY LIVING (ADL): ICD-10-CM

## 2023-06-08 DIAGNOSIS — R68.89 DECREASED ACTIVITY TOLERANCE: ICD-10-CM

## 2023-06-08 DIAGNOSIS — R20.0 NUMBNESS AND TINGLING IN BOTH HANDS: Primary | ICD-10-CM

## 2023-06-08 DIAGNOSIS — M25.561 RIGHT KNEE PAIN, UNSPECIFIED CHRONICITY: Primary | ICD-10-CM

## 2023-06-08 DIAGNOSIS — R29.898 IMPAIRED STRENGTH OF LOWER EXTREMITY: ICD-10-CM

## 2023-06-08 LAB
ALBUMIN SERPL-MCNC: 3.8 G/DL (ref 3.5–5)
ALBUMIN/GLOB SERPL: 1 (ref 0.4–1.6)
ALP SERPL-CCNC: 113 U/L (ref 50–136)
ALT SERPL-CCNC: 19 U/L (ref 12–65)
ANION GAP SERPL CALC-SCNC: 5 MMOL/L (ref 2–11)
AST SERPL-CCNC: 13 U/L (ref 15–37)
BILIRUB SERPL-MCNC: 0.3 MG/DL (ref 0.2–1.1)
BUN SERPL-MCNC: 11 MG/DL (ref 6–23)
CALCIUM SERPL-MCNC: 9.3 MG/DL (ref 8.3–10.4)
CHLORIDE SERPL-SCNC: 108 MMOL/L (ref 101–110)
CHOLEST SERPL-MCNC: 182 MG/DL
CO2 SERPL-SCNC: 28 MMOL/L (ref 21–32)
CREAT SERPL-MCNC: 0.7 MG/DL (ref 0.6–1)
GLOBULIN SER CALC-MCNC: 3.9 G/DL (ref 2.8–4.5)
GLUCOSE SERPL-MCNC: 85 MG/DL (ref 65–100)
HDLC SERPL-MCNC: 31 MG/DL (ref 40–60)
HDLC SERPL: 5.9
LDLC SERPL CALC-MCNC: 114.2 MG/DL
POTASSIUM SERPL-SCNC: 4.3 MMOL/L (ref 3.5–5.1)
PROT SERPL-MCNC: 7.7 G/DL (ref 6.3–8.2)
SODIUM SERPL-SCNC: 141 MMOL/L (ref 133–143)
TRIGL SERPL-MCNC: 184 MG/DL (ref 35–150)
VLDLC SERPL CALC-MCNC: 36.8 MG/DL (ref 6–23)

## 2023-06-08 PROCEDURE — 95886 MUSC TEST DONE W/N TEST COMP: CPT | Performed by: PHYSICAL MEDICINE & REHABILITATION

## 2023-06-08 PROCEDURE — 95912 NRV CNDJ TEST 11-12 STUDIES: CPT | Performed by: PHYSICAL MEDICINE & REHABILITATION

## 2023-06-08 NOTE — PROGRESS NOTES
hopping. .    PT GOAL(S) ATTAINED:  5/18/23  Patient will voice understanding of symptom limited activity principle. Patient will report compliance w home program of exercise, cold and elevation. Patient will demonstrate independent skill w initial HEP. Maintain full extension of involved knee. 6/2/23:  Flexion of involved knee will improve to ?120? Judith Balling PT GOAL(S) NOT ATTAINED:  5/18/23  Patient will report maintaining pain ? 2/10 w symptom limited activity, use of cold and elevation, meds and exercise. PLAN     Assess response to progression. Continue to progress hip strength and stability as able. Consider lateral stepping, modified sit to/from stand  Monitor for joint pain. GOALS     LONG - TERM GOALS TO BE ATTAINED BY 6/15/23:  LEFS score ? 56/80. Patient will return to normal sleep pattern re quality and duration. Flexion of involved knee will improve to ?125? Judith Balling Patient will attain full knee extension in order to be able to ambulate w/ appropriate knee extension during WBing phase of gait cycle. Patient will attain LQ function - ROM, mm strength - endurance, coordination of movement - that allows patient to:  -     ambulate w/o need of assistive device  -     sit, stand, squat, kneel, lift ? 15# from the floor. negotiate stairs w a reciprocal gait pattern. negotiate uneven ground safely and w/o need of assistive device. resume desired activities/attain patient goals of: resolving knee complaints and returning to full activity including full employment. Evaluate     [employer]     Access Code: [facility]  URL: [email or web address]  Date: 05/08/2023  Prepared by: Aaron Chi    Exercises  - Supine Heel Slide  - 2 x daily - 6 x weekly - 1 - 3 sets - 10 - 25 reps  - Supine Quad Set  - 2 x daily - 6 x weekly - 1 sets - 5 reps - 10 hold  - Active Straight Leg Raise with Quad Set  - 2 x daily - 6 x weekly - 1 - 3 sets - 10 - 25 reps  - Sidelying Hip Abduction  - 2 x daily - 6 x weekly

## 2023-06-15 ENCOUNTER — OFFICE VISIT (OUTPATIENT)
Dept: FAMILY MEDICINE CLINIC | Facility: CLINIC | Age: 53
End: 2023-06-15
Payer: COMMERCIAL

## 2023-06-15 VITALS
DIASTOLIC BLOOD PRESSURE: 80 MMHG | BODY MASS INDEX: 31.36 KG/M2 | HEART RATE: 88 BPM | WEIGHT: 177 LBS | HEIGHT: 63 IN | OXYGEN SATURATION: 99 % | SYSTOLIC BLOOD PRESSURE: 132 MMHG

## 2023-06-15 DIAGNOSIS — M77.11 RIGHT TENNIS ELBOW: ICD-10-CM

## 2023-06-15 DIAGNOSIS — K80.20 GALLSTONES: Primary | ICD-10-CM

## 2023-06-15 DIAGNOSIS — Z12.11 SCREENING FOR COLON CANCER: ICD-10-CM

## 2023-06-15 DIAGNOSIS — Z12.4 SCREENING FOR CERVICAL CANCER: ICD-10-CM

## 2023-06-15 DIAGNOSIS — E78.2 MIXED HYPERLIPIDEMIA: ICD-10-CM

## 2023-06-15 DIAGNOSIS — M18.11 ARTHRITIS OF CARPOMETACARPAL (CMC) JOINT OF RIGHT THUMB: ICD-10-CM

## 2023-06-15 DIAGNOSIS — F41.9 ANXIETY: ICD-10-CM

## 2023-06-15 DIAGNOSIS — F32.A DEPRESSION, UNSPECIFIED DEPRESSION TYPE: ICD-10-CM

## 2023-06-15 DIAGNOSIS — G89.29 CHRONIC PAIN OF RIGHT KNEE: ICD-10-CM

## 2023-06-15 DIAGNOSIS — G56.03 BILATERAL CARPAL TUNNEL SYNDROME: ICD-10-CM

## 2023-06-15 DIAGNOSIS — G47.00 INSOMNIA, UNSPECIFIED TYPE: ICD-10-CM

## 2023-06-15 DIAGNOSIS — Z23 ENCOUNTER FOR IMMUNIZATION: ICD-10-CM

## 2023-06-15 DIAGNOSIS — Z12.31 ENCOUNTER FOR SCREENING MAMMOGRAM FOR MALIGNANT NEOPLASM OF BREAST: ICD-10-CM

## 2023-06-15 DIAGNOSIS — M25.561 CHRONIC PAIN OF RIGHT KNEE: ICD-10-CM

## 2023-06-15 PROBLEM — M25.461 EFFUSION OF RIGHT KNEE: Status: RESOLVED | Noted: 2023-03-10 | Resolved: 2023-06-15

## 2023-06-15 PROCEDURE — 99214 OFFICE O/P EST MOD 30 MIN: CPT | Performed by: NURSE PRACTITIONER

## 2023-06-15 RX ORDER — NAPROXEN 250 MG/1
250 TABLET ORAL 2 TIMES DAILY PRN
Qty: 30 TABLET | Refills: 5 | Status: SHIPPED | OUTPATIENT
Start: 2023-06-15

## 2023-06-15 RX ORDER — ATORVASTATIN CALCIUM 40 MG/1
40 TABLET, FILM COATED ORAL DAILY
Qty: 90 TABLET | Refills: 1 | Status: SHIPPED | OUTPATIENT
Start: 2023-06-15

## 2023-06-15 RX ORDER — ATORVASTATIN CALCIUM 40 MG/1
40 TABLET, FILM COATED ORAL DAILY
Qty: 30 TABLET | Refills: 5 | Status: SHIPPED | OUTPATIENT
Start: 2023-06-15 | End: 2023-06-15 | Stop reason: SDUPTHER

## 2023-06-15 SDOH — ECONOMIC STABILITY: HOUSING INSECURITY
IN THE LAST 12 MONTHS, WAS THERE A TIME WHEN YOU DID NOT HAVE A STEADY PLACE TO SLEEP OR SLEPT IN A SHELTER (INCLUDING NOW)?: NO

## 2023-06-15 SDOH — ECONOMIC STABILITY: FOOD INSECURITY: WITHIN THE PAST 12 MONTHS, THE FOOD YOU BOUGHT JUST DIDN'T LAST AND YOU DIDN'T HAVE MONEY TO GET MORE.: NEVER TRUE

## 2023-06-15 SDOH — ECONOMIC STABILITY: FOOD INSECURITY: WITHIN THE PAST 12 MONTHS, YOU WORRIED THAT YOUR FOOD WOULD RUN OUT BEFORE YOU GOT MONEY TO BUY MORE.: NEVER TRUE

## 2023-06-15 SDOH — ECONOMIC STABILITY: INCOME INSECURITY: HOW HARD IS IT FOR YOU TO PAY FOR THE VERY BASICS LIKE FOOD, HOUSING, MEDICAL CARE, AND HEATING?: NOT HARD AT ALL

## 2023-06-15 ASSESSMENT — PATIENT HEALTH QUESTIONNAIRE - PHQ9
7. TROUBLE CONCENTRATING ON THINGS, SUCH AS READING THE NEWSPAPER OR WATCHING TELEVISION: 0
SUM OF ALL RESPONSES TO PHQ QUESTIONS 1-9: 0
2. FEELING DOWN, DEPRESSED OR HOPELESS: 0
5. POOR APPETITE OR OVEREATING: 0
4. FEELING TIRED OR HAVING LITTLE ENERGY: 0
1. LITTLE INTEREST OR PLEASURE IN DOING THINGS: 0
SUM OF ALL RESPONSES TO PHQ QUESTIONS 1-9: 0
3. TROUBLE FALLING OR STAYING ASLEEP: 0
8. MOVING OR SPEAKING SO SLOWLY THAT OTHER PEOPLE COULD HAVE NOTICED. OR THE OPPOSITE, BEING SO FIGETY OR RESTLESS THAT YOU HAVE BEEN MOVING AROUND A LOT MORE THAN USUAL: 0
SUM OF ALL RESPONSES TO PHQ9 QUESTIONS 1 & 2: 0
6. FEELING BAD ABOUT YOURSELF - OR THAT YOU ARE A FAILURE OR HAVE LET YOURSELF OR YOUR FAMILY DOWN: 0
10. IF YOU CHECKED OFF ANY PROBLEMS, HOW DIFFICULT HAVE THESE PROBLEMS MADE IT FOR YOU TO DO YOUR WORK, TAKE CARE OF THINGS AT HOME, OR GET ALONG WITH OTHER PEOPLE: 0
SUM OF ALL RESPONSES TO PHQ QUESTIONS 1-9: 0
9. THOUGHTS THAT YOU WOULD BE BETTER OFF DEAD, OR OF HURTING YOURSELF: 0
SUM OF ALL RESPONSES TO PHQ QUESTIONS 1-9: 0

## 2023-06-20 ENCOUNTER — OFFICE VISIT (OUTPATIENT)
Dept: ORTHOPEDIC SURGERY | Age: 53
End: 2023-06-20

## 2023-06-20 DIAGNOSIS — Z09 S/P ORTHOPEDIC SURGERY, FOLLOW-UP EXAM: Primary | ICD-10-CM

## 2023-06-20 DIAGNOSIS — M25.461 EFFUSION OF RIGHT KNEE: ICD-10-CM

## 2023-06-20 RX ORDER — METHYLPREDNISOLONE 4 MG/1
TABLET ORAL
Qty: 1 KIT | Refills: 0 | Status: SHIPPED | OUTPATIENT
Start: 2023-06-20 | End: 2023-06-26

## 2023-06-20 NOTE — PROGRESS NOTES
Name: Kaylee Gallego  YOB: 1970  Gender: female  MRN: [id number]    Procedure: Right Knee Arthroscopy Major Synovectomy,  Lateral Menisectomy - Right     Surgery Date: 4/19/2023      Subjective:Returns 4 weeks status post the above procedure. She reports she was doing well, until she fell on the front of her knee while mopping yesterday. She does have an effusion superior to the patella. She notes this causes discomfort when walking and flexing her knee. She is having difficulty fully weightbearing on her knee. Right knee: Moderate effusion present. Diffuse tenderness to palpation throughout the knee. Full active extension with flexion limited to 100 degrees with pain in the extremes. Able to extend against gravity and perform a straight leg raise. Negative ligamentous exam x4. Mild pain with Simran's. Positive bounce home test.    Imaging:  Knee XR: 3 views     Clinical Indication  1. S/P orthopedic surgery, follow-up exam    2. Effusion of right knee           Report: AP, lateral, sunrise views of the Right knee demonstrates no acute fracture dislocation, or advanced degenerative changes    Impression: No acute findings as above             Assessment:   1. S/P orthopedic surgery, follow-up exam    2. Effusion of right knee         Plan:   I think the patient does have an effusion from a fall onto her knee although I have low concern for any ligamentous damage at this time. She does have some provocative signs on exam with meniscal pathology however I have a low concern for retear of her meniscus at this time. I discussed conservative treatment options to include aspiration and injection versus oral corticosteroids. She does not wish to proceed with any type of injection at this time. I will provide her with oral steroids to try and alleviate some of her acute symptoms.   I will see her back in 1 week for consideration of aspiration injection if she is not getting

## 2023-06-22 ENCOUNTER — TREATMENT (OUTPATIENT)
Age: 53
End: 2023-06-22

## 2023-06-22 DIAGNOSIS — R29.898 IMPAIRED STRENGTH OF LOWER EXTREMITY: ICD-10-CM

## 2023-06-22 DIAGNOSIS — Z78.9 DECREASED ACTIVITIES OF DAILY LIVING (ADL): ICD-10-CM

## 2023-06-22 DIAGNOSIS — R68.89 DECREASED ACTIVITY TOLERANCE: ICD-10-CM

## 2023-06-22 DIAGNOSIS — M25.469 KNEE SWELLING: Primary | ICD-10-CM

## 2023-06-22 DIAGNOSIS — M25.661 DECREASED RANGE OF MOTION OF RIGHT KNEE: ICD-10-CM

## 2023-06-22 DIAGNOSIS — Z74.09 IMPAIRED FUNCTIONAL MOBILITY, BALANCE, GAIT, AND ENDURANCE: ICD-10-CM

## 2023-06-22 DIAGNOSIS — M25.561 RIGHT KNEE PAIN, UNSPECIFIED CHRONICITY: ICD-10-CM

## 2023-06-22 NOTE — PROGRESS NOTES
hold  - Active Straight Leg Raise with Quad Set  - 2 x daily - 6 x weekly - 1 - 3 sets - 10 - 25 reps  - Sidelying Hip Abduction  - 2 x daily - 6 x weekly - 1 - 3 sets - 10 - 25 reps  - Prone Knee Flexion Extension AROM  - 2 x daily - 6 x weekly - 1 - 3 sets - 10 - 25 reps  - Prone Hip Extension with Bent Knee - One Pillow  - 2 x daily - 6 x weekly - 1 - 3 sets - 10 - 25 reps

## 2023-06-26 ASSESSMENT — ENCOUNTER SYMPTOMS
TROUBLE SWALLOWING: 0
VOMITING: 0
FACIAL SWELLING: 0
ABDOMINAL DISTENTION: 0
WHEEZING: 0
SORE THROAT: 0
VOICE CHANGE: 0
RECTAL PAIN: 0
STRIDOR: 0
COUGH: 0
RHINORRHEA: 0
BLOOD IN STOOL: 0
SINUS PAIN: 0
APNEA: 0
NAUSEA: 0
CHEST TIGHTNESS: 0
EYE PAIN: 0
EYES NEGATIVE: 1
ALLERGIC/IMMUNOLOGIC NEGATIVE: 1
COLOR CHANGE: 0
SHORTNESS OF BREATH: 0
ANAL BLEEDING: 0
SINUS PRESSURE: 0
RESPIRATORY NEGATIVE: 1
ABDOMINAL PAIN: 0
DIARRHEA: 0
CHOKING: 0
BACK PAIN: 0
EYE DISCHARGE: 0
CONSTIPATION: 0

## 2023-06-27 ENCOUNTER — OFFICE VISIT (OUTPATIENT)
Dept: ORTHOPEDIC SURGERY | Age: 53
End: 2023-06-27

## 2023-06-27 DIAGNOSIS — Z09 S/P ORTHOPEDIC SURGERY, FOLLOW-UP EXAM: Primary | ICD-10-CM

## 2023-06-27 PROCEDURE — 99024 POSTOP FOLLOW-UP VISIT: CPT | Performed by: ORTHOPAEDIC SURGERY

## 2023-07-19 ENCOUNTER — OFFICE VISIT (OUTPATIENT)
Dept: ORTHOPEDIC SURGERY | Age: 53
End: 2023-07-19

## 2023-07-19 DIAGNOSIS — M77.11 RIGHT TENNIS ELBOW: Primary | ICD-10-CM

## 2023-07-19 DIAGNOSIS — G56.02 LEFT CARPAL TUNNEL SYNDROME: ICD-10-CM

## 2023-07-19 DIAGNOSIS — G56.01 RIGHT CARPAL TUNNEL SYNDROME: ICD-10-CM

## 2023-07-19 DIAGNOSIS — M18.12 ARTHRITIS OF CARPOMETACARPAL (CMC) JOINT OF LEFT THUMB: ICD-10-CM

## 2023-07-19 DIAGNOSIS — M18.11 ARTHRITIS OF CARPOMETACARPAL (CMC) JOINT OF RIGHT THUMB: ICD-10-CM

## 2023-07-19 RX ORDER — BETAMETHASONE SODIUM PHOSPHATE AND BETAMETHASONE ACETATE 3; 3 MG/ML; MG/ML
12 INJECTION, SUSPENSION INTRA-ARTICULAR; INTRALESIONAL; INTRAMUSCULAR; SOFT TISSUE ONCE
Status: COMPLETED | OUTPATIENT
Start: 2023-07-19 | End: 2023-07-19

## 2023-07-19 RX ORDER — MELOXICAM 15 MG/1
15 TABLET ORAL DAILY
Qty: 30 TABLET | Refills: 1 | Status: SHIPPED | OUTPATIENT
Start: 2023-07-19 | End: 2023-09-17

## 2023-07-19 RX ADMIN — BETAMETHASONE SODIUM PHOSPHATE AND BETAMETHASONE ACETATE 12 MG: 3; 3 INJECTION, SUSPENSION INTRA-ARTICULAR; INTRALESIONAL; INTRAMUSCULAR; SOFT TISSUE at 16:01

## 2023-07-20 NOTE — PROGRESS NOTES
Orthopaedic Hand Surgery Note    Name: Jack Speaker  Age: 47 y.o. YOB: 1970  Gender: female  MRN: [id number]    Follow up: Elbow Pain    HPI: Patient is a 47 y.o. female who return for evaluation of right lateral epicondylitis. On the last visit we performed a right lateral epicondyle steroid injection as well as bilateral carpal tunnel steroid injections and bilateral thumb CMC joint steroid injections, patient reports all injections provided great relief, she is not having much numbness and paresthesias in median distribution anymore, she is having some pain of bilateral thumb bases, this is slightly better than before so the injection has worn off somewhat there, she is having severe pain at the lateral epicondyle. ROS/Meds/PSH/PMH/FH/SH: I personally reviewed the patients standard intake form. Pertinents are discussed in the HPI    Physical Examination:  General: Awake and alert. HEENT: Normocephalic, atraumatic  CV/Pulm: Breathing even and unlabored  Circulation: Normal without obvious arterial or venous deficiency. Pulses palpable bilateral upper extremities. Skin: No obvious rashes noted. Lymphatic: No obvious evidence of lymphedema or lymphadenopathy    Musculoskeletal:   Examination on the right upper extremity demonstrates normal sensation to light touch in the median, ulnar and radial distribution, cap refill < 5 seconds in all fingers. There is moderate tenderness on the lateral epicondyle, no tenderness on the medial epicondyle or the olecranon, pain on the lateral elbow is aggravated by chairlift test and resisted wrist extension, there is no pain with palpation of the radial tunnel, negative Tinel along the radial nerve tract, no pain with resisted supination, no pain with resisted pronation.     Imaging / Electrodiagnostic Tests:     n nerve conduction study was reviewed with the patient, this demonstrates borderline elevated sensory latency on the left wrist at

## 2023-12-27 ENCOUNTER — OFFICE VISIT (OUTPATIENT)
Dept: FAMILY MEDICINE CLINIC | Facility: CLINIC | Age: 53
End: 2023-12-27

## 2023-12-27 VITALS
OXYGEN SATURATION: 97 % | BODY MASS INDEX: 30.12 KG/M2 | RESPIRATION RATE: 16 BRPM | DIASTOLIC BLOOD PRESSURE: 82 MMHG | WEIGHT: 170 LBS | HEIGHT: 63 IN | HEART RATE: 80 BPM | SYSTOLIC BLOOD PRESSURE: 130 MMHG

## 2023-12-27 DIAGNOSIS — M18.11 ARTHRITIS OF CARPOMETACARPAL (CMC) JOINT OF RIGHT THUMB: ICD-10-CM

## 2023-12-27 DIAGNOSIS — G89.29 CHRONIC PAIN OF RIGHT KNEE: ICD-10-CM

## 2023-12-27 DIAGNOSIS — M25.561 CHRONIC PAIN OF RIGHT KNEE: ICD-10-CM

## 2023-12-27 DIAGNOSIS — R41.3 SHORT-TERM MEMORY LOSS: ICD-10-CM

## 2023-12-27 DIAGNOSIS — F32.A DEPRESSION, UNSPECIFIED DEPRESSION TYPE: ICD-10-CM

## 2023-12-27 DIAGNOSIS — Z12.4 SCREENING FOR CERVICAL CANCER: ICD-10-CM

## 2023-12-27 DIAGNOSIS — G56.03 BILATERAL CARPAL TUNNEL SYNDROME: ICD-10-CM

## 2023-12-27 DIAGNOSIS — M77.11 RIGHT TENNIS ELBOW: ICD-10-CM

## 2023-12-27 DIAGNOSIS — K80.20 GALLSTONES: ICD-10-CM

## 2023-12-27 DIAGNOSIS — Z23 ENCOUNTER FOR IMMUNIZATION: ICD-10-CM

## 2023-12-27 DIAGNOSIS — E78.2 MIXED HYPERLIPIDEMIA: ICD-10-CM

## 2023-12-27 DIAGNOSIS — K12.0 APHTHOUS ULCER: Primary | ICD-10-CM

## 2023-12-27 DIAGNOSIS — F41.9 ANXIETY: ICD-10-CM

## 2023-12-27 DIAGNOSIS — G47.00 INSOMNIA, UNSPECIFIED TYPE: ICD-10-CM

## 2023-12-27 DIAGNOSIS — Z12.11 SCREENING FOR COLON CANCER: ICD-10-CM

## 2023-12-27 DIAGNOSIS — Z12.31 ENCOUNTER FOR SCREENING MAMMOGRAM FOR MALIGNANT NEOPLASM OF BREAST: ICD-10-CM

## 2023-12-27 RX ORDER — ATORVASTATIN CALCIUM 40 MG/1
40 TABLET, FILM COATED ORAL DAILY
Qty: 90 TABLET | Refills: 1 | Status: SHIPPED | OUTPATIENT
Start: 2023-12-27

## 2023-12-27 NOTE — PROGRESS NOTES
[place] [address]  Phone: [phone] Fax [phone]  Kadeem Rutherford MS, APRN, FNP-C  12/27/2023  Chief Complaint   Patient presents with   • Follow-up     Pt here today for f/u to recheck chronic conditions. Pt has been lost to f/u since 6/15/23. Please see ROS/Assessment and Plan section for full details of all items addressed during today's appointment. Pt is Northern Irish speaking and her daughter accompanied her today and served as English/.        ASSESSMENT/PLAN:  Below is the assessment and plan developed based on review of pertinent history, physical exam, labs, studies, and medications.    1. Aphthous ulcer  Pt reports having a fever and viral GE (N/V/D) last week. Pt reports fever and viral GE symptoms have fully resolved but reports having some painful aphthous ulcers to buccal mucosa and tongue that developed after. Discussed with pt. Will treat aphthous ulcers with PRN Magic Mouthwash as directed. Written Rx given to take to compound pharmacy. Pt to f/u with me in 6-8 weeks. Will monitor.     2. Gallstones  Pt was seen in ER at [facility] 6/1/23 for intermittent upper abdominal pain and was found to have gallstones without acute cholecystitis on abdominal U/S. Pt denies any N/V/D (since viral GE resolved last week-see # 1 above), heartburn/reflux, yellowing of her skin or eyes. Pt reports intermittent RUQ pain that radiates to right upper back. Pt's LFT's were WNL on most recent CMP 12/22/23. HIDA scan was previously ordered to further evaluate gallbladder function and pt was previously referred pt to General Surgery for further evaluation. Pt never had HIDA scan and never scheduled with General Surgery. Discussed with pt. Pt again referred to General Surgery for further evaluation. Pt given number to radiology scheduling to schedule HIDA scan in near future. Pt to f/u with me in 6-8 weeks. Pt agrees to call sooner for

## 2024-01-07 ASSESSMENT — ENCOUNTER SYMPTOMS
WHEEZING: 0
STRIDOR: 0
CHOKING: 0
CONSTIPATION: 0
TROUBLE SWALLOWING: 0
SINUS PAIN: 0
EYE DISCHARGE: 0
ABDOMINAL PAIN: 1
DIARRHEA: 0
ANAL BLEEDING: 0
ALLERGIC/IMMUNOLOGIC NEGATIVE: 1
ABDOMINAL DISTENTION: 0
EYES NEGATIVE: 1
RECTAL PAIN: 0
SORE THROAT: 0
COLOR CHANGE: 0
SINUS PRESSURE: 0
RHINORRHEA: 0
BACK PAIN: 0
EYE PAIN: 0
FACIAL SWELLING: 0
CHEST TIGHTNESS: 0
APNEA: 0
RESPIRATORY NEGATIVE: 1
COUGH: 0
BLOOD IN STOOL: 0
NAUSEA: 0
SHORTNESS OF BREATH: 0
VOMITING: 0
VOICE CHANGE: 0

## 2024-01-16 ENCOUNTER — OFFICE VISIT (OUTPATIENT)
Dept: NEUROLOGY | Age: 54
End: 2024-01-16
Payer: COMMERCIAL

## 2024-01-16 DIAGNOSIS — F03.A0 MILD DEMENTIA WITHOUT BEHAVIORAL DISTURBANCE, PSYCHOTIC DISTURBANCE, MOOD DISTURBANCE, OR ANXIETY, UNSPECIFIED DEMENTIA TYPE (HCC): Primary | ICD-10-CM

## 2024-01-16 PROCEDURE — 99204 OFFICE O/P NEW MOD 45 MIN: CPT | Performed by: PSYCHIATRY & NEUROLOGY

## 2024-01-16 RX ORDER — MEMANTINE HYDROCHLORIDE 10 MG/1
TABLET ORAL
Qty: 60 TABLET | Refills: 11 | Status: SHIPPED | OUTPATIENT
Start: 2024-01-16

## 2024-01-16 ASSESSMENT — ENCOUNTER SYMPTOMS
EYES NEGATIVE: 1
ALLERGIC/IMMUNOLOGIC NEGATIVE: 1
RESPIRATORY NEGATIVE: 1
GASTROINTESTINAL NEGATIVE: 1

## 2024-01-16 NOTE — PROGRESS NOTES
living.        IMAGING REVIEW:  I REVIEWED PERTINENT  IMAGES AND REPORTS WITH THE PATIENT PERSONALLY, DIRECTLY AND FULLY.     Past Medical History:  Past Medical History:   Diagnosis Date    Anxiety     managed eith medication    Arthritis     rt thumb    COVID-19 2022    not hospitalized    PONV (postoperative nausea and vomiting)        Past Surgical History:  Past Surgical History:   Procedure Laterality Date    COLONOSCOPY N/A 1/30/2023    COLONOSCOPY POLYPECTOMY SNARE/COLD BIOPSY performed by Velvet Ruth MD at  ENDOSCOPY    KNEE ARTHROSCOPY Right 4/19/2023    RIGHT KNEE ARTHROSCOPY MAJOR SYNOVECTOMY,  LATERAL MENISECTOMY performed by Seth Marshall MD at  [facility]    OTHER SURGICAL HISTORY      vaginal wall mesh    OTHER SURGICAL HISTORY      gum surgery    OTHER SURGICAL HISTORY      Partial hysterectomy cervix removed per pt    TUBAL LIGATION         Social History:  Social History     Socioeconomic History    Marital status:      Spouse name: Not on file    Number of children: Not on file    Years of education: Not on file    Highest education level: Not on file   Occupational History    Not on file   Tobacco Use    Smoking status: Never    Smokeless tobacco: Never   Vaping Use    Vaping Use: Never used   Substance and Sexual Activity    Alcohol use: Not Currently    Drug use: Not Currently    Sexual activity: Not on file   Other Topics Concern    Not on file   Social History Narrative    Not on file     Social Determinants of Health     Financial Resource Strain: Low Risk  (6/15/2023)    Overall Financial Resource Strain (CARDIA)     Difficulty of Paying Living Expenses: Not hard at all   Food Insecurity: Not on file (6/15/2023)   Transportation Needs: Unknown (6/15/2023)    PRAPARE - Transportation     Lack of Transportation (Medical): Not on file     Lack of Transportation (Non-Medical): No   Physical Activity: Not on file   Stress: Not on file   Social Connections: Not on file   Intimate

## 2024-01-22 ENCOUNTER — CLINICAL DOCUMENTATION (OUTPATIENT)
Dept: NEUROLOGY | Age: 54
End: 2024-01-22

## 2024-01-26 ENCOUNTER — OFFICE VISIT (OUTPATIENT)
Dept: SURGERY | Age: 54
End: 2024-01-26
Payer: COMMERCIAL

## 2024-01-26 VITALS
HEIGHT: 63 IN | DIASTOLIC BLOOD PRESSURE: 76 MMHG | WEIGHT: 168 LBS | SYSTOLIC BLOOD PRESSURE: 138 MMHG | BODY MASS INDEX: 29.77 KG/M2 | HEART RATE: 94 BPM | OXYGEN SATURATION: 95 %

## 2024-01-26 DIAGNOSIS — G89.29 CHRONIC BILATERAL LOW BACK PAIN WITHOUT SCIATICA: ICD-10-CM

## 2024-01-26 DIAGNOSIS — K80.20 GALLSTONES: Primary | ICD-10-CM

## 2024-01-26 DIAGNOSIS — M54.50 CHRONIC BILATERAL LOW BACK PAIN WITHOUT SCIATICA: ICD-10-CM

## 2024-01-26 PROCEDURE — 99204 OFFICE O/P NEW MOD 45 MIN: CPT | Performed by: SURGERY

## 2024-01-26 NOTE — PROGRESS NOTES
[facility]  [facility]  [address], [address]  [place] [address]  [phone]    Office Note/Consult Note   Johanny Nguyen   MRN: [id number]     : 1970        HPI: Johanny Nguyen is a 53 y.o. female who is seen in the office on 2024, referred by Kadeem Rutherford for gallbladder evaluation.  She was initially referred back in 2023, but never followed through with appointment.  That referral occurred following a visit to the [facility] ER on 2023.  She had presented with fever, chills, runny nose and a sore throat.  She also had some abdominal pain and during that evaluation was noted to have cholelithiasis without evidence of cholecystitis by ultrasound.  Blood work evaluation was unremarkable.  She was seen in follow-up by Kadeem Rutherford, who noted no abnormalities of her blood work and had noted complete resolution of her abdominal symptoms following resolution of her viral illness.  He ordered HIDA scan with CCK stimulation to evaluate gallbladder function.  She did not obtain the study.  He planned to see her back for 3-month follow-up, which did not occur until the end of 2023.  She had been lost to follow-up.  She denied abdominal symptoms since recovery from her viral illness in .  He resubmitted referral to general surgery for gallbladder evaluation and encouraged her to obtain the HIDA scan.  Blood work was obtained in December with continued normal values of LFTs.    She is present today and is interviewed with the assistance of the remote  service with assistance in Yoruba translation.  She denies any abdominal symptoms.  She denies nausea or vomiting or bloating.  She denies any postprandial triggers.  Her complaint is of low back pain at the top of her pelvis that comes around her pelvis.  She states it begins after she gets up in the morning and is worsened by movements and as the day progresses.  She

## 2024-02-09 ENCOUNTER — HOSPITAL ENCOUNTER (OUTPATIENT)
Dept: ULTRASOUND IMAGING | Age: 54
Discharge: HOME | End: 2024-02-09
Attending: SURGERY
Payer: COMMERCIAL

## 2024-02-09 ENCOUNTER — OFFICE VISIT (OUTPATIENT)
Dept: FAMILY MEDICINE CLINIC | Facility: CLINIC | Age: 54
End: 2024-02-09
Payer: COMMERCIAL

## 2024-02-09 VITALS
WEIGHT: 170 LBS | RESPIRATION RATE: 16 BRPM | HEIGHT: 63 IN | BODY MASS INDEX: 30.12 KG/M2 | DIASTOLIC BLOOD PRESSURE: 78 MMHG | OXYGEN SATURATION: 98 % | SYSTOLIC BLOOD PRESSURE: 118 MMHG | HEART RATE: 78 BPM

## 2024-02-09 DIAGNOSIS — Z23 ENCOUNTER FOR IMMUNIZATION: ICD-10-CM

## 2024-02-09 DIAGNOSIS — K80.20 GALLSTONES: ICD-10-CM

## 2024-02-09 DIAGNOSIS — G47.00 INSOMNIA, UNSPECIFIED TYPE: ICD-10-CM

## 2024-02-09 DIAGNOSIS — M25.561 CHRONIC PAIN OF RIGHT KNEE: ICD-10-CM

## 2024-02-09 DIAGNOSIS — E78.2 MIXED HYPERLIPIDEMIA: ICD-10-CM

## 2024-02-09 DIAGNOSIS — M21.612 BUNION OF GREAT TOE OF LEFT FOOT: ICD-10-CM

## 2024-02-09 DIAGNOSIS — G89.29 CHRONIC BILATERAL LOW BACK PAIN WITHOUT SCIATICA: ICD-10-CM

## 2024-02-09 DIAGNOSIS — F41.9 ANXIETY: ICD-10-CM

## 2024-02-09 DIAGNOSIS — G89.29 CHRONIC PAIN OF RIGHT KNEE: ICD-10-CM

## 2024-02-09 DIAGNOSIS — M18.11 ARTHRITIS OF CARPOMETACARPAL (CMC) JOINT OF RIGHT THUMB: ICD-10-CM

## 2024-02-09 DIAGNOSIS — Z12.11 SCREENING FOR COLON CANCER: ICD-10-CM

## 2024-02-09 DIAGNOSIS — F32.A DEPRESSION, UNSPECIFIED DEPRESSION TYPE: ICD-10-CM

## 2024-02-09 DIAGNOSIS — M77.11 RIGHT TENNIS ELBOW: ICD-10-CM

## 2024-02-09 DIAGNOSIS — R41.3 SHORT-TERM MEMORY LOSS: ICD-10-CM

## 2024-02-09 DIAGNOSIS — Z12.31 ENCOUNTER FOR SCREENING MAMMOGRAM FOR MALIGNANT NEOPLASM OF BREAST: ICD-10-CM

## 2024-02-09 DIAGNOSIS — G56.03 BILATERAL CARPAL TUNNEL SYNDROME: ICD-10-CM

## 2024-02-09 DIAGNOSIS — R14.0 BLOATING: ICD-10-CM

## 2024-02-09 DIAGNOSIS — Z12.4 SCREENING FOR CERVICAL CANCER: ICD-10-CM

## 2024-02-09 DIAGNOSIS — M54.50 CHRONIC BILATERAL LOW BACK PAIN WITHOUT SCIATICA: ICD-10-CM

## 2024-02-09 DIAGNOSIS — R10.33 PERIUMBILICAL ABDOMINAL CRAMPING: Primary | ICD-10-CM

## 2024-02-09 PROBLEM — K12.0 APHTHOUS ULCER: Status: RESOLVED | Noted: 2023-12-27 | Resolved: 2024-02-09

## 2024-02-09 LAB
BASOPHILS # BLD: 0.1 K/UL (ref 0–0.2)
BASOPHILS NFR BLD: 1 % (ref 0–2)
DIFFERENTIAL METHOD BLD: NORMAL
EOSINOPHIL # BLD: 0.1 K/UL (ref 0–0.8)
EOSINOPHIL NFR BLD: 1 % (ref 0.5–7.8)
ERYTHROCYTE [DISTWIDTH] IN BLOOD BY AUTOMATED COUNT: 13.3 % (ref 11.9–14.6)
HCT VFR BLD AUTO: 42.1 % (ref 35.8–46.3)
HGB BLD-MCNC: 13.4 G/DL (ref 11.7–15.4)
IMM GRANULOCYTES # BLD AUTO: 0 K/UL (ref 0–0.5)
IMM GRANULOCYTES NFR BLD AUTO: 0 % (ref 0–5)
LYMPHOCYTES # BLD: 3.3 K/UL (ref 0.5–4.6)
LYMPHOCYTES NFR BLD: 32 % (ref 13–44)
MCH RBC QN AUTO: 28.9 PG (ref 26.1–32.9)
MCHC RBC AUTO-ENTMCNC: 31.8 G/DL (ref 31.4–35)
MCV RBC AUTO: 90.7 FL (ref 82–102)
MONOCYTES # BLD: 0.6 K/UL (ref 0.1–1.3)
MONOCYTES NFR BLD: 6 % (ref 4–12)
NEUTS SEG # BLD: 6.2 K/UL (ref 1.7–8.2)
NEUTS SEG NFR BLD: 60 % (ref 43–78)
NRBC # BLD: 0 K/UL (ref 0–0.2)
PLATELET # BLD AUTO: 291 K/UL (ref 150–450)
PMV BLD AUTO: 11.3 FL (ref 9.4–12.3)
RBC # BLD AUTO: 4.64 M/UL (ref 4.05–5.2)
WBC # BLD AUTO: 10.3 K/UL (ref 4.3–11.1)

## 2024-02-09 PROCEDURE — 76700 US EXAM ABDOM COMPLETE: CPT

## 2024-02-09 PROCEDURE — 99214 OFFICE O/P EST MOD 30 MIN: CPT | Performed by: NURSE PRACTITIONER

## 2024-02-09 RX ORDER — DICYCLOMINE HYDROCHLORIDE 10 MG/1
10 CAPSULE ORAL 3 TIMES DAILY PRN
Qty: 30 CAPSULE | Refills: 5 | Status: SHIPPED | OUTPATIENT
Start: 2024-02-09

## 2024-02-09 RX ORDER — SIMETHICONE 80 MG
80 TABLET,CHEWABLE ORAL 3 TIMES DAILY PRN
Qty: 30 TABLET | Refills: 5 | Status: SHIPPED | OUTPATIENT
Start: 2024-02-09

## 2024-02-09 ASSESSMENT — PATIENT HEALTH QUESTIONNAIRE - PHQ9
SUM OF ALL RESPONSES TO PHQ QUESTIONS 1-9: 0
SUM OF ALL RESPONSES TO PHQ9 QUESTIONS 1 & 2: 0
SUM OF ALL RESPONSES TO PHQ QUESTIONS 1-9: 0
1. LITTLE INTEREST OR PLEASURE IN DOING THINGS: 0
2. FEELING DOWN, DEPRESSED OR HOPELESS: 0
SUM OF ALL RESPONSES TO PHQ QUESTIONS 1-9: 0
SUM OF ALL RESPONSES TO PHQ QUESTIONS 1-9: 0

## 2024-02-09 NOTE — PROGRESS NOTES
external ear normal. There is no impacted cerumen.      Nose: Nose normal. No congestion or rhinorrhea.      Mouth/Throat:      Mouth: Mucous membranes are moist.      Pharynx: Oropharynx is clear. No oropharyngeal exudate or posterior oropharyngeal erythema.   Eyes:      General: No scleral icterus.        Right eye: No discharge.         Left eye: No discharge.      Extraocular Movements: Extraocular movements intact.      Conjunctiva/sclera: Conjunctivae normal.      Pupils: Pupils are equal, round, and reactive to light.   Cardiovascular:      Rate and Rhythm: Normal rate and regular rhythm.      Pulses: Normal pulses.      Heart sounds: Normal heart sounds. No murmur heard.     No friction rub. No gallop.   Pulmonary:      Effort: Pulmonary effort is normal. No respiratory distress.      Breath sounds: Normal breath sounds. No stridor. No wheezing, rhonchi or rales.   Chest:      Chest wall: No tenderness.   Abdominal:      General: Abdomen is flat. Bowel sounds are normal. There is distension (Abdomen benign on physical exam today except for mild bloating).      Palpations: Abdomen is soft. There is no mass.      Tenderness: There is no abdominal tenderness. There is no right CVA tenderness, left CVA tenderness, guarding or rebound.      Hernia: No hernia is present.      Comments: Abdomen benign on physical exam today except for mild bloating.   Musculoskeletal:         General: Tenderness present. No swelling, deformity or signs of injury. Normal range of motion.      Cervical back: Normal range of motion and neck supple. No rigidity or tenderness.      Right lower leg: No edema.      Left lower leg: No edema.      Comments: Focused on right knee: No edema, erythema, or increased warmth noted to right knee, but does have minimal generalized tenderness and edema. ROM intact without crepitus. Cannot displace in any plane. Pt able to bear weight and ambulates with steady gait, unassisted and without

## 2024-02-10 LAB
ALBUMIN SERPL-MCNC: 4.1 G/DL (ref 3.5–5)
ALBUMIN/GLOB SERPL: 1.1 (ref 0.4–1.6)
ALP SERPL-CCNC: 124 U/L (ref 50–136)
ALT SERPL-CCNC: 23 U/L (ref 12–65)
ANION GAP SERPL CALC-SCNC: 3 MMOL/L (ref 2–11)
AST SERPL-CCNC: 15 U/L (ref 15–37)
BILIRUB SERPL-MCNC: 0.3 MG/DL (ref 0.2–1.1)
BUN SERPL-MCNC: 11 MG/DL (ref 6–23)
CALCIUM SERPL-MCNC: 9.8 MG/DL (ref 8.3–10.4)
CHLORIDE SERPL-SCNC: 111 MMOL/L (ref 103–113)
CO2 SERPL-SCNC: 29 MMOL/L (ref 21–32)
CREAT SERPL-MCNC: 0.7 MG/DL (ref 0.6–1)
GLOBULIN SER CALC-MCNC: 3.7 G/DL (ref 2.8–4.5)
GLUCOSE SERPL-MCNC: 87 MG/DL (ref 65–100)
POTASSIUM SERPL-SCNC: 3.7 MMOL/L (ref 3.5–5.1)
PROT SERPL-MCNC: 7.8 G/DL (ref 6.3–8.2)
SODIUM SERPL-SCNC: 143 MMOL/L (ref 136–146)

## 2024-02-12 LAB
BACTERIA SPEC CULT: NORMAL
BACTERIA SPEC CULT: NORMAL
SERVICE CMNT-IMP: NORMAL

## 2024-02-20 ENCOUNTER — HOSPITAL ENCOUNTER (OUTPATIENT)
Dept: MAMMOGRAPHY | Age: 54
Discharge: HOME | End: 2024-02-23
Payer: COMMERCIAL

## 2024-02-20 DIAGNOSIS — Z12.31 ENCOUNTER FOR SCREENING MAMMOGRAM FOR MALIGNANT NEOPLASM OF BREAST: ICD-10-CM

## 2024-02-20 PROCEDURE — 77067 SCR MAMMO BI INCL CAD: CPT

## 2024-02-23 ENCOUNTER — OFFICE VISIT (OUTPATIENT)
Dept: SURGERY | Age: 54
End: 2024-02-23
Payer: COMMERCIAL

## 2024-02-23 VITALS
SYSTOLIC BLOOD PRESSURE: 146 MMHG | HEIGHT: 63 IN | HEART RATE: 78 BPM | WEIGHT: 168 LBS | DIASTOLIC BLOOD PRESSURE: 98 MMHG | BODY MASS INDEX: 29.77 KG/M2

## 2024-02-23 DIAGNOSIS — K80.20 ASYMPTOMATIC CHOLELITHIASIS: Primary | ICD-10-CM

## 2024-02-23 DIAGNOSIS — K80.20 GALLSTONES: ICD-10-CM

## 2024-02-23 PROCEDURE — 99214 OFFICE O/P EST MOD 30 MIN: CPT | Performed by: SURGERY

## 2024-02-23 NOTE — PROGRESS NOTES
[facility]  [facility]  [address], [address]  [place] [address]  [phone]    Office Note   Johanny Nguyen   MRN: [id number]     : 1970        HPI: Johanny Nguyen is a 53 y.o. female who returns to the office on 2024, following updated ultrasound evaluation and obtaining ultrasound from [facility].  Ultrasound again shows cholelithiasis without abnormalities of the gallbladder wall or common duct.  There is some hepatic enlargement.  She continues to deny the presence of any upper abdominal symptoms or symptoms similar to biliary colic or consistent with a biliary source.    She is seen in the office on 2024, referred by Kadeem Rutherford for gallbladder evaluation.  She was initially referred back in 2023, but never followed through with appointment.  That referral occurred following a visit to the [facility] ER on 2023.  She had presented with fever, chills, runny nose and a sore throat.  She also had some abdominal pain and during that evaluation was noted to have cholelithiasis without evidence of cholecystitis by ultrasound.  Blood work evaluation was unremarkable.  She was seen in follow-up by Kadeem Rutherford, who noted no abnormalities of her blood work and had noted complete resolution of her abdominal symptoms following resolution of her viral illness.  He ordered HIDA scan with CCK stimulation to evaluate gallbladder function.  She did not obtain the study.  He planned to see her back for 3-month follow-up, which did not occur until the end of 2023.  She had been lost to follow-up.  She denied abdominal symptoms since recovery from her viral illness in .  He resubmitted referral to general surgery for gallbladder evaluation and encouraged her to obtain the HIDA scan.  Blood work was obtained in December with continued normal values of LFTs.    She is present today and is interviewed with the assistance of the

## 2024-04-16 ENCOUNTER — NURSE ONLY (OUTPATIENT)
Dept: FAMILY MEDICINE CLINIC | Facility: CLINIC | Age: 54
End: 2024-04-16

## 2024-04-16 DIAGNOSIS — E78.2 MIXED HYPERLIPIDEMIA: ICD-10-CM

## 2024-04-16 LAB
CHOLEST SERPL-MCNC: 170 MG/DL
HDLC SERPL-MCNC: 46 MG/DL (ref 40–60)
HDLC SERPL: 3.7
LDLC SERPL CALC-MCNC: 99.6 MG/DL
TRIGL SERPL-MCNC: 122 MG/DL (ref 35–150)
VLDLC SERPL CALC-MCNC: 24.4 MG/DL (ref 6–23)

## 2024-05-07 ENCOUNTER — OFFICE VISIT (OUTPATIENT)
Dept: FAMILY MEDICINE CLINIC | Facility: CLINIC | Age: 54
End: 2024-05-07
Payer: COMMERCIAL

## 2024-05-07 VITALS
RESPIRATION RATE: 16 BRPM | HEART RATE: 70 BPM | WEIGHT: 166 LBS | DIASTOLIC BLOOD PRESSURE: 78 MMHG | SYSTOLIC BLOOD PRESSURE: 126 MMHG | HEIGHT: 63 IN | OXYGEN SATURATION: 99 % | BODY MASS INDEX: 29.41 KG/M2

## 2024-05-07 DIAGNOSIS — G56.03 BILATERAL CARPAL TUNNEL SYNDROME: ICD-10-CM

## 2024-05-07 DIAGNOSIS — E78.2 MIXED HYPERLIPIDEMIA: ICD-10-CM

## 2024-05-07 DIAGNOSIS — I83.92 ASYMPTOMATIC VARICOSE VEINS OF LEFT LOWER EXTREMITY: ICD-10-CM

## 2024-05-07 DIAGNOSIS — Z12.31 ENCOUNTER FOR SCREENING MAMMOGRAM FOR MALIGNANT NEOPLASM OF BREAST: ICD-10-CM

## 2024-05-07 DIAGNOSIS — R41.3 SHORT-TERM MEMORY LOSS: ICD-10-CM

## 2024-05-07 DIAGNOSIS — Z12.11 SCREENING FOR COLON CANCER: ICD-10-CM

## 2024-05-07 DIAGNOSIS — M21.612 BUNION OF GREAT TOE OF LEFT FOOT: ICD-10-CM

## 2024-05-07 DIAGNOSIS — G89.29 CHRONIC PAIN OF RIGHT KNEE: ICD-10-CM

## 2024-05-07 DIAGNOSIS — F41.9 ANXIETY: ICD-10-CM

## 2024-05-07 DIAGNOSIS — F32.A DEPRESSION, UNSPECIFIED DEPRESSION TYPE: ICD-10-CM

## 2024-05-07 DIAGNOSIS — M77.11 RIGHT TENNIS ELBOW: ICD-10-CM

## 2024-05-07 DIAGNOSIS — M18.11 ARTHRITIS OF CARPOMETACARPAL (CMC) JOINT OF RIGHT THUMB: ICD-10-CM

## 2024-05-07 DIAGNOSIS — M25.561 CHRONIC PAIN OF RIGHT KNEE: ICD-10-CM

## 2024-05-07 DIAGNOSIS — Z23 ENCOUNTER FOR IMMUNIZATION: ICD-10-CM

## 2024-05-07 DIAGNOSIS — K80.20 ASYMPTOMATIC CHOLELITHIASIS: ICD-10-CM

## 2024-05-07 DIAGNOSIS — K21.9 GASTROESOPHAGEAL REFLUX DISEASE WITHOUT ESOPHAGITIS: Primary | ICD-10-CM

## 2024-05-07 DIAGNOSIS — G47.00 INSOMNIA, UNSPECIFIED TYPE: ICD-10-CM

## 2024-05-07 DIAGNOSIS — Z12.4 SCREENING FOR CERVICAL CANCER: ICD-10-CM

## 2024-05-07 PROBLEM — R10.33 PERIUMBILICAL ABDOMINAL CRAMPING: Status: RESOLVED | Noted: 2022-10-31 | Resolved: 2024-05-07

## 2024-05-07 PROBLEM — R14.0 BLOATING: Status: RESOLVED | Noted: 2024-02-09 | Resolved: 2024-05-07

## 2024-05-07 PROCEDURE — 99214 OFFICE O/P EST MOD 30 MIN: CPT | Performed by: NURSE PRACTITIONER

## 2024-05-07 RX ORDER — POLYETHYLENE GLYCOL 3350 17 G/17G
17 POWDER, FOR SOLUTION ORAL DAILY
COMMUNITY
End: 2024-05-07

## 2024-05-07 RX ORDER — ATORVASTATIN CALCIUM 40 MG/1
40 TABLET, FILM COATED ORAL DAILY
Qty: 90 TABLET | Refills: 1 | Status: SHIPPED | OUTPATIENT
Start: 2024-05-07

## 2024-05-07 RX ORDER — OMEPRAZOLE 20 MG/1
20 CAPSULE, DELAYED RELEASE ORAL
Qty: 90 CAPSULE | Refills: 1 | Status: SHIPPED | OUTPATIENT
Start: 2024-05-07

## 2024-05-07 NOTE — PROGRESS NOTES
[place] [address]  Phone: [phone] Fax [phone]  Kadeem Rutherford MS, APRN, FNP-C  5/7/2024  Chief Complaint   Patient presents with   • Follow-up     Pt here today for f/u to recheck chronic conditions. Please see ROS/Assessment and Plan section for full details of all items addressed during today's appointment. Pt is Kiswahili speaking and her daughter accompanied her today and served as English/.        ASSESSMENT/PLAN:  Below is the assessment and plan developed based on review of pertinent history, physical exam, labs, studies, and medications.    1. Gastroesophageal reflux disease without esophagitis  Pt reports having some increased heartburn and reflux recently but denies any N/V/D, abd pain, GI bleeding, or constipation. Pt's abd was benign on physical exam today. Discussed with pt/daughter. Will have pt stop oral NSAIDs and start PRN topical Voltaren 1% gel for MSK issues-see # 9-12 below. Will start pt on Omeprazole 20 mg po daily and have pt start GERD diet. Pt to f/u with me in 3 months. Pt agrees to call sooner for concerns/new or worsening symptoms. If not improving by next appointment, will refer pt to GI for EGD. Will monitor.   - omeprazole (PRILOSEC) 20 MG delayed release capsule; Take 1 capsule by mouth every morning (before breakfast)  Dispense: 90 capsule; Refill: 1  - CBC with Auto Differential; Future  - Comprehensive Metabolic Panel; Future    2. Asymptomatic cholelithiasis  Pt was seen in ER at [facility] 6/1/23 for intermittent upper abdominal pain and was found to have gallstones without acute cholecystitis on abdominal U/S. Pt denies any N/V/D, heartburn/reflux, yellowing of her skin or eyes. Pt previously reported intermittent RUQ pain that radiated to right upper back. HIDA scan was previously ordered to further evaluate gallbladder function and pt was previously referred pt to General Surgery for further

## 2024-07-12 ENCOUNTER — TELEPHONE (OUTPATIENT)
Dept: NEUROLOGY | Age: 54
End: 2024-07-12

## 2024-08-26 ENCOUNTER — LAB (OUTPATIENT)
Dept: FAMILY MEDICINE CLINIC | Facility: CLINIC | Age: 54
End: 2024-08-26
Payer: COMMERCIAL

## 2024-08-26 DIAGNOSIS — E78.2 MIXED HYPERLIPIDEMIA: ICD-10-CM

## 2024-08-26 DIAGNOSIS — K21.9 GASTROESOPHAGEAL REFLUX DISEASE WITHOUT ESOPHAGITIS: ICD-10-CM

## 2024-08-26 DIAGNOSIS — F32.A DEPRESSION, UNSPECIFIED DEPRESSION TYPE: ICD-10-CM

## 2024-08-26 DIAGNOSIS — F41.9 ANXIETY: ICD-10-CM

## 2024-08-26 DIAGNOSIS — K80.20 ASYMPTOMATIC CHOLELITHIASIS: ICD-10-CM

## 2024-08-26 DIAGNOSIS — R41.3 SHORT-TERM MEMORY LOSS: ICD-10-CM

## 2024-08-26 DIAGNOSIS — G47.00 INSOMNIA, UNSPECIFIED TYPE: ICD-10-CM

## 2024-08-26 LAB
ALBUMIN SERPL-MCNC: 3.7 G/DL (ref 3.5–5)
ALBUMIN/GLOB SERPL: 1.1 (ref 1–1.9)
ALP SERPL-CCNC: 129 U/L (ref 35–104)
ALT SERPL-CCNC: 18 U/L (ref 12–65)
ANION GAP SERPL CALC-SCNC: 9 MMOL/L (ref 9–18)
AST SERPL-CCNC: 22 U/L (ref 15–37)
BASOPHILS # BLD: 0.1 K/UL (ref 0–0.2)
BASOPHILS NFR BLD: 1 % (ref 0–2)
BILIRUB SERPL-MCNC: <0.2 MG/DL (ref 0–1.2)
BUN SERPL-MCNC: 10 MG/DL (ref 6–23)
CALCIUM SERPL-MCNC: 9.5 MG/DL (ref 8.8–10.2)
CHLORIDE SERPL-SCNC: 102 MMOL/L (ref 98–107)
CHOLEST SERPL-MCNC: 204 MG/DL (ref 0–200)
CO2 SERPL-SCNC: 27 MMOL/L (ref 20–28)
CREAT SERPL-MCNC: 0.6 MG/DL (ref 0.6–1.1)
DIFFERENTIAL METHOD BLD: ABNORMAL
EOSINOPHIL # BLD: 0.2 K/UL (ref 0–0.8)
EOSINOPHIL NFR BLD: 2 % (ref 0.5–7.8)
ERYTHROCYTE [DISTWIDTH] IN BLOOD BY AUTOMATED COUNT: 13.8 % (ref 11.9–14.6)
GLOBULIN SER CALC-MCNC: 3.4 G/DL (ref 2.3–3.5)
GLUCOSE SERPL-MCNC: 85 MG/DL (ref 70–99)
HCT VFR BLD AUTO: 42.3 % (ref 35.8–46.3)
HDLC SERPL-MCNC: 30 MG/DL (ref 40–60)
HDLC SERPL: 6.7 (ref 0–5)
HGB BLD-MCNC: 13.2 G/DL (ref 11.7–15.4)
IMM GRANULOCYTES # BLD AUTO: 0 K/UL (ref 0–0.5)
IMM GRANULOCYTES NFR BLD AUTO: 0 % (ref 0–5)
LDLC SERPL CALC-MCNC: 127 MG/DL (ref 0–100)
LYMPHOCYTES # BLD: 3.3 K/UL (ref 0.5–4.6)
LYMPHOCYTES NFR BLD: 32 % (ref 13–44)
MCH RBC QN AUTO: 28.4 PG (ref 26.1–32.9)
MCHC RBC AUTO-ENTMCNC: 31.2 G/DL (ref 31.4–35)
MCV RBC AUTO: 91 FL (ref 82–102)
MONOCYTES # BLD: 0.6 K/UL (ref 0.1–1.3)
MONOCYTES NFR BLD: 6 % (ref 4–12)
NEUTS SEG # BLD: 6.1 K/UL (ref 1.7–8.2)
NEUTS SEG NFR BLD: 59 % (ref 43–78)
NRBC # BLD: 0 K/UL (ref 0–0.2)
PLATELET # BLD AUTO: 313 K/UL (ref 150–450)
PMV BLD AUTO: 10.9 FL (ref 9.4–12.3)
POTASSIUM SERPL-SCNC: 4 MMOL/L (ref 3.5–5.1)
PROT SERPL-MCNC: 7.2 G/DL (ref 6.3–8.2)
RBC # BLD AUTO: 4.65 M/UL (ref 4.05–5.2)
SODIUM SERPL-SCNC: 139 MMOL/L (ref 136–145)
TRIGL SERPL-MCNC: 231 MG/DL (ref 0–150)
TSH W FREE THYROID IF ABNORMAL: 2.04 UIU/ML (ref 0.27–4.2)
VLDLC SERPL CALC-MCNC: 46 MG/DL (ref 6–23)
WBC # BLD AUTO: 10.2 K/UL (ref 4.3–11.1)

## 2024-08-26 PROCEDURE — 36415 COLL VENOUS BLD VENIPUNCTURE: CPT | Performed by: NURSE PRACTITIONER

## 2024-08-27 PROBLEM — R74.8 ALKALINE PHOSPHATASE ELEVATION: Status: ACTIVE | Noted: 2024-08-27

## 2025-02-06 ENCOUNTER — OFFICE VISIT (OUTPATIENT)
Dept: FAMILY MEDICINE CLINIC | Facility: CLINIC | Age: 55
End: 2025-02-06

## 2025-02-06 VITALS
BODY MASS INDEX: 29.95 KG/M2 | WEIGHT: 169 LBS | HEIGHT: 63 IN | OXYGEN SATURATION: 98 % | DIASTOLIC BLOOD PRESSURE: 70 MMHG | SYSTOLIC BLOOD PRESSURE: 128 MMHG | HEART RATE: 90 BPM

## 2025-02-06 DIAGNOSIS — M54.42 CHRONIC LEFT-SIDED LOW BACK PAIN WITH LEFT-SIDED SCIATICA: Primary | ICD-10-CM

## 2025-02-06 DIAGNOSIS — Z13.1 SCREENING FOR DIABETES MELLITUS: ICD-10-CM

## 2025-02-06 DIAGNOSIS — G89.29 CHRONIC LEFT-SIDED LOW BACK PAIN WITH LEFT-SIDED SCIATICA: Primary | ICD-10-CM

## 2025-02-06 DIAGNOSIS — G47.00 INSOMNIA, UNSPECIFIED TYPE: ICD-10-CM

## 2025-02-06 DIAGNOSIS — Z12.11 SCREENING FOR COLON CANCER: ICD-10-CM

## 2025-02-06 DIAGNOSIS — F32.A DEPRESSION, UNSPECIFIED DEPRESSION TYPE: ICD-10-CM

## 2025-02-06 DIAGNOSIS — K80.20 ASYMPTOMATIC CHOLELITHIASIS: ICD-10-CM

## 2025-02-06 DIAGNOSIS — E78.2 MIXED HYPERLIPIDEMIA: ICD-10-CM

## 2025-02-06 DIAGNOSIS — K21.9 GASTROESOPHAGEAL REFLUX DISEASE WITHOUT ESOPHAGITIS: ICD-10-CM

## 2025-02-06 DIAGNOSIS — Z12.4 SCREENING FOR CERVICAL CANCER: ICD-10-CM

## 2025-02-06 DIAGNOSIS — Z00.00 LABORATORY EXAM ORDERED AS PART OF ROUTINE GENERAL MEDICAL EXAMINATION: ICD-10-CM

## 2025-02-06 DIAGNOSIS — Z23 ENCOUNTER FOR IMMUNIZATION: ICD-10-CM

## 2025-02-06 DIAGNOSIS — R41.3 SHORT-TERM MEMORY LOSS: ICD-10-CM

## 2025-02-06 DIAGNOSIS — Z12.31 ENCOUNTER FOR SCREENING MAMMOGRAM FOR MALIGNANT NEOPLASM OF BREAST: ICD-10-CM

## 2025-02-06 DIAGNOSIS — F41.9 ANXIETY: ICD-10-CM

## 2025-02-06 PROBLEM — R74.8 ALKALINE PHOSPHATASE ELEVATION: Status: RESOLVED | Noted: 2024-08-27 | Resolved: 2025-02-06

## 2025-02-06 PROBLEM — R20.0 NUMBNESS AND TINGLING IN BOTH HANDS: Status: RESOLVED | Noted: 2022-10-31 | Resolved: 2025-02-06

## 2025-02-06 PROBLEM — M18.11 ARTHRITIS OF CARPOMETACARPAL (CMC) JOINT OF RIGHT THUMB: Status: RESOLVED | Noted: 2023-04-05 | Resolved: 2025-02-06

## 2025-02-06 PROBLEM — M79.642 BILATERAL HAND PAIN: Status: RESOLVED | Noted: 2022-10-31 | Resolved: 2025-02-06

## 2025-02-06 PROBLEM — M25.532 BILATERAL WRIST PAIN: Status: RESOLVED | Noted: 2022-10-31 | Resolved: 2025-02-06

## 2025-02-06 PROBLEM — I83.92 ASYMPTOMATIC VARICOSE VEINS OF LEFT LOWER EXTREMITY: Status: RESOLVED | Noted: 2024-05-07 | Resolved: 2025-02-06

## 2025-02-06 PROBLEM — M54.50 CHRONIC BILATERAL LOW BACK PAIN WITHOUT SCIATICA: Status: RESOLVED | Noted: 2024-01-26 | Resolved: 2025-02-06

## 2025-02-06 PROBLEM — G56.03 BILATERAL CARPAL TUNNEL SYNDROME: Status: RESOLVED | Noted: 2023-04-05 | Resolved: 2025-02-06

## 2025-02-06 PROBLEM — M25.531 BILATERAL WRIST PAIN: Status: RESOLVED | Noted: 2022-10-31 | Resolved: 2025-02-06

## 2025-02-06 PROBLEM — R20.2 NUMBNESS AND TINGLING IN BOTH HANDS: Status: RESOLVED | Noted: 2022-10-31 | Resolved: 2025-02-06

## 2025-02-06 PROBLEM — M21.612 BUNION OF GREAT TOE OF LEFT FOOT: Status: RESOLVED | Noted: 2024-02-09 | Resolved: 2025-02-06

## 2025-02-06 PROBLEM — M77.11 RIGHT TENNIS ELBOW: Status: RESOLVED | Noted: 2023-04-05 | Resolved: 2025-02-06

## 2025-02-06 PROBLEM — M79.641 BILATERAL HAND PAIN: Status: RESOLVED | Noted: 2022-10-31 | Resolved: 2025-02-06

## 2025-02-06 PROBLEM — M25.561 CHRONIC PAIN OF RIGHT KNEE: Status: RESOLVED | Noted: 2022-09-23 | Resolved: 2025-02-06

## 2025-02-06 PROBLEM — M23.300 DEGENERATIVE TEAR OF LATERAL MENISCUS OF RIGHT KNEE: Status: RESOLVED | Noted: 2023-03-10 | Resolved: 2025-02-06

## 2025-02-06 LAB
ALBUMIN SERPL-MCNC: 3.8 G/DL (ref 3.5–5)
ALBUMIN/GLOB SERPL: 1 (ref 1–1.9)
ALP SERPL-CCNC: 115 U/L (ref 35–104)
ALT SERPL-CCNC: 20 U/L (ref 8–45)
ANION GAP SERPL CALC-SCNC: 11 MMOL/L (ref 7–16)
AST SERPL-CCNC: 23 U/L (ref 15–37)
BASOPHILS # BLD: 0.07 K/UL (ref 0–0.2)
BASOPHILS NFR BLD: 0.6 % (ref 0–2)
BILIRUB SERPL-MCNC: 0.3 MG/DL (ref 0–1.2)
BUN SERPL-MCNC: 13 MG/DL (ref 6–23)
CALCIUM SERPL-MCNC: 9.8 MG/DL (ref 8.8–10.2)
CHLORIDE SERPL-SCNC: 103 MMOL/L (ref 98–107)
CO2 SERPL-SCNC: 27 MMOL/L (ref 20–29)
CREAT SERPL-MCNC: 0.7 MG/DL (ref 0.6–1.1)
DIFFERENTIAL METHOD BLD: NORMAL
EOSINOPHIL # BLD: 0.14 K/UL (ref 0–0.8)
EOSINOPHIL NFR BLD: 1.3 % (ref 0.5–7.8)
ERYTHROCYTE [DISTWIDTH] IN BLOOD BY AUTOMATED COUNT: 14 % (ref 11.9–14.6)
EST. AVERAGE GLUCOSE BLD GHB EST-MCNC: 114 MG/DL
GLOBULIN SER CALC-MCNC: 3.7 G/DL (ref 2.3–3.5)
GLUCOSE SERPL-MCNC: 101 MG/DL (ref 70–99)
HBA1C MFR BLD: 5.6 % (ref 0–5.6)
HCT VFR BLD AUTO: 39.6 % (ref 35.8–46.3)
HGB BLD-MCNC: 12.9 G/DL (ref 11.7–15.4)
IMM GRANULOCYTES # BLD AUTO: 0.04 K/UL (ref 0–0.5)
IMM GRANULOCYTES NFR BLD AUTO: 0.4 % (ref 0–5)
LYMPHOCYTES # BLD: 3.32 K/UL (ref 0.5–4.6)
LYMPHOCYTES NFR BLD: 30.5 % (ref 13–44)
MCH RBC QN AUTO: 28.7 PG (ref 26.1–32.9)
MCHC RBC AUTO-ENTMCNC: 32.6 G/DL (ref 31.4–35)
MCV RBC AUTO: 88 FL (ref 82–102)
MONOCYTES # BLD: 0.47 K/UL (ref 0.1–1.3)
MONOCYTES NFR BLD: 4.3 % (ref 4–12)
NEUTS SEG # BLD: 6.84 K/UL (ref 1.7–8.2)
NEUTS SEG NFR BLD: 62.9 % (ref 43–78)
NRBC # BLD: 0 K/UL (ref 0–0.2)
PLATELET # BLD AUTO: 284 K/UL (ref 150–450)
PMV BLD AUTO: 11.5 FL (ref 9.4–12.3)
POTASSIUM SERPL-SCNC: 3.6 MMOL/L (ref 3.5–5.1)
PROT SERPL-MCNC: 7.4 G/DL (ref 6.3–8.2)
RBC # BLD AUTO: 4.5 M/UL (ref 4.05–5.2)
SODIUM SERPL-SCNC: 140 MMOL/L (ref 136–145)
TSH W FREE THYROID IF ABNORMAL: 1.83 UIU/ML (ref 0.27–4.2)
WBC # BLD AUTO: 10.9 K/UL (ref 4.3–11.1)

## 2025-02-06 RX ORDER — TIZANIDINE 2 MG/1
2 TABLET ORAL NIGHTLY PRN
Qty: 30 TABLET | Refills: 5 | Status: SHIPPED | OUTPATIENT
Start: 2025-02-06

## 2025-02-06 RX ORDER — PREDNISONE 20 MG/1
TABLET ORAL
Qty: 11 TABLET | Refills: 0 | Status: SHIPPED | OUTPATIENT
Start: 2025-02-06

## 2025-02-06 SDOH — ECONOMIC STABILITY: FOOD INSECURITY: WITHIN THE PAST 12 MONTHS, YOU WORRIED THAT YOUR FOOD WOULD RUN OUT BEFORE YOU GOT MONEY TO BUY MORE.: NEVER TRUE

## 2025-02-06 SDOH — ECONOMIC STABILITY: FOOD INSECURITY: WITHIN THE PAST 12 MONTHS, THE FOOD YOU BOUGHT JUST DIDN'T LAST AND YOU DIDN'T HAVE MONEY TO GET MORE.: NEVER TRUE

## 2025-02-06 ASSESSMENT — PATIENT HEALTH QUESTIONNAIRE - PHQ9
SUM OF ALL RESPONSES TO PHQ9 QUESTIONS 1 & 2: 0
4. FEELING TIRED OR HAVING LITTLE ENERGY: NOT AT ALL
SUM OF ALL RESPONSES TO PHQ QUESTIONS 1-9: 0
3. TROUBLE FALLING OR STAYING ASLEEP: NOT AT ALL
1. LITTLE INTEREST OR PLEASURE IN DOING THINGS: NOT AT ALL
9. THOUGHTS THAT YOU WOULD BE BETTER OFF DEAD, OR OF HURTING YOURSELF: NOT AT ALL
5. POOR APPETITE OR OVEREATING: NOT AT ALL
SUM OF ALL RESPONSES TO PHQ QUESTIONS 1-9: 0
6. FEELING BAD ABOUT YOURSELF - OR THAT YOU ARE A FAILURE OR HAVE LET YOURSELF OR YOUR FAMILY DOWN: NOT AT ALL
10. IF YOU CHECKED OFF ANY PROBLEMS, HOW DIFFICULT HAVE THESE PROBLEMS MADE IT FOR YOU TO DO YOUR WORK, TAKE CARE OF THINGS AT HOME, OR GET ALONG WITH OTHER PEOPLE: NOT DIFFICULT AT ALL
SUM OF ALL RESPONSES TO PHQ QUESTIONS 1-9: 0
7. TROUBLE CONCENTRATING ON THINGS, SUCH AS READING THE NEWSPAPER OR WATCHING TELEVISION: NOT AT ALL
SUM OF ALL RESPONSES TO PHQ QUESTIONS 1-9: 0
8. MOVING OR SPEAKING SO SLOWLY THAT OTHER PEOPLE COULD HAVE NOTICED. OR THE OPPOSITE, BEING SO FIGETY OR RESTLESS THAT YOU HAVE BEEN MOVING AROUND A LOT MORE THAN USUAL: NOT AT ALL
2. FEELING DOWN, DEPRESSED OR HOPELESS: NOT AT ALL

## 2025-02-06 NOTE — PROGRESS NOTES
toxic-appearing or diaphoretic.   HENT:      Head: Normocephalic and atraumatic.      Right Ear: Tympanic membrane, ear canal and external ear normal. There is no impacted cerumen.      Left Ear: Tympanic membrane, ear canal and external ear normal. There is no impacted cerumen.      Nose: Nose normal. No congestion or rhinorrhea.      Mouth/Throat:      Mouth: Mucous membranes are moist.      Pharynx: Oropharynx is clear. No oropharyngeal exudate or posterior oropharyngeal erythema.   Eyes:      General: No scleral icterus.        Right eye: No discharge.         Left eye: No discharge.      Extraocular Movements: Extraocular movements intact.      Conjunctiva/sclera: Conjunctivae normal.      Pupils: Pupils are equal, round, and reactive to light.   Cardiovascular:      Rate and Rhythm: Normal rate and regular rhythm.      Pulses: Normal pulses.      Heart sounds: Normal heart sounds. No murmur heard.     No friction rub. No gallop.   Pulmonary:      Effort: Pulmonary effort is normal. No respiratory distress.      Breath sounds: Normal breath sounds. No stridor. No wheezing, rhonchi or rales.   Chest:      Chest wall: No tenderness.   Abdominal:      General: Abdomen is flat. Bowel sounds are normal. There is no distension.      Palpations: Abdomen is soft. There is no mass.      Tenderness: There is no abdominal tenderness. There is no right CVA tenderness, left CVA tenderness, guarding or rebound.      Hernia: No hernia is present.   Musculoskeletal:         General: Tenderness present. No swelling, deformity or signs of injury. Normal range of motion.      Cervical back: Normal range of motion and neck supple. No rigidity or tenderness.      Right lower leg: No edema.      Left lower leg: No edema.      Comments: Pt has good T and L spine alignment. No edema or point ttp noted to central vertebrae. Pt with some left sided lower lumbar paraspinus spasm/ttp. Pt with some mild decreased ROM to back due to pain.

## 2025-02-07 DIAGNOSIS — R74.8 ALKALINE PHOSPHATASE ELEVATION: Primary | ICD-10-CM

## 2025-02-20 ENCOUNTER — TELEPHONE (OUTPATIENT)
Age: 55
End: 2025-02-20

## 2025-02-20 ENCOUNTER — PREP FOR PROCEDURE (OUTPATIENT)
Age: 55
End: 2025-02-20

## 2025-02-20 DIAGNOSIS — Z12.11 ENCOUNTER FOR SCREENING COLONOSCOPY: ICD-10-CM

## 2025-02-20 RX ORDER — SODIUM CHLORIDE 0.9 % (FLUSH) 0.9 %
5-40 SYRINGE (ML) INJECTION EVERY 12 HOURS SCHEDULED
Status: CANCELLED | OUTPATIENT
Start: 2025-02-20

## 2025-02-20 RX ORDER — SODIUM CHLORIDE 9 MG/ML
25 INJECTION, SOLUTION INTRAVENOUS PRN
Status: CANCELLED | OUTPATIENT
Start: 2025-02-20

## 2025-02-20 RX ORDER — SODIUM CHLORIDE 0.9 % (FLUSH) 0.9 %
5-40 SYRINGE (ML) INJECTION PRN
Status: CANCELLED | OUTPATIENT
Start: 2025-02-20

## 2025-02-20 NOTE — TELEPHONE ENCOUNTER
Patients daughter Brianda called in to schedule colon screening for patient requested the Miralax prep due to cost reasons mailed prep instructions out to her address as requested / Patito on 03/24/2025

## 2025-02-25 ENCOUNTER — OFFICE VISIT (OUTPATIENT)
Age: 55
End: 2025-02-25
Payer: COMMERCIAL

## 2025-02-25 VITALS — BODY MASS INDEX: 29.36 KG/M2 | WEIGHT: 165.7 LBS | HEIGHT: 63 IN

## 2025-02-25 DIAGNOSIS — M54.16 LUMBAR RADICULOPATHY: ICD-10-CM

## 2025-02-25 DIAGNOSIS — M51.362 DEGENERATION OF INTERVERTEBRAL DISC OF LUMBAR REGION WITH DISCOGENIC BACK PAIN AND LOWER EXTREMITY PAIN: ICD-10-CM

## 2025-02-25 DIAGNOSIS — M54.50 LOW BACK PAIN, UNSPECIFIED BACK PAIN LATERALITY, UNSPECIFIED CHRONICITY, UNSPECIFIED WHETHER SCIATICA PRESENT: Primary | ICD-10-CM

## 2025-02-25 PROCEDURE — 99204 OFFICE O/P NEW MOD 45 MIN: CPT | Performed by: NURSE PRACTITIONER

## 2025-02-25 RX ORDER — TIZANIDINE 2 MG/1
2 TABLET ORAL 3 TIMES DAILY PRN
Qty: 30 TABLET | Refills: 0 | Status: SHIPPED | OUTPATIENT
Start: 2025-02-25

## 2025-02-25 NOTE — PROGRESS NOTES
tablet, Take 1 tablet by mouth daily (Patient not taking: Reported on 2/6/2025), Disp: 90 tablet, Rfl: 1    Past Surgical History:   Procedure Laterality Date    COLONOSCOPY N/A 1/30/2023    COLONOSCOPY POLYPECTOMY SNARE/COLD BIOPSY performed by Velvet Ruth MD at [facility] ENDOSCOPY    KNEE ARTHROSCOPY Right 4/19/2023    RIGHT KNEE ARTHROSCOPY MAJOR SYNOVECTOMY,  LATERAL MENISECTOMY performed by Seth Marshall MD at [facility] OPC    OTHER SURGICAL HISTORY      vaginal wall mesh    OTHER SURGICAL HISTORY      gum surgery    OTHER SURGICAL HISTORY      Partial hysterectomy cervix removed per pt    TUBAL LIGATION         Patient Active Problem List   Diagnosis    Depression    Anxiety    Insomnia    Mixed hyperlipidemia    Asymptomatic cholelithiasis    Chronic left-sided low back pain with left-sided sciatica    Gastroesophageal reflux disease without esophagitis    Alkaline phosphatase elevation    Encounter for screening colonoscopy         Tobacco:  reports that she has never smoked. She has never been exposed to tobacco smoke. She has never used smokeless tobacco.  Alcohol:   Social History     Substance and Sexual Activity   Alcohol Use Not Currently        Physical Exam:   Body mass index is 29.82 kg/m².  GENERAL:  Adult in no acute distress, well developed, well nourished Patient is appropriately conversant  MSK:  Examination of the lumbar spine reveals spinal tenderness , paraspinal tenderness , facet tenderness   There is moderate tenderness to palpation along the spinous processes and paraspinal musculature.   The patient ambulates with a unsteady gait.    ROM of bilateral hip(s) reveals no irritability.   NEURO:  Cranial nerves grossly intact.  No motor deficits.    Straight leg testing is positive left  Sensory testing reveals intact sensation to light touch and in the distribution of the L3-S1 dermatomes bilaterally  Ankle jerk is negative for clonus    Reflexes   Right Left   Quadriceps (L4) 2 2   Achilles (S1) 2

## 2025-03-15 ENCOUNTER — HOSPITAL ENCOUNTER (OUTPATIENT)
Dept: MAMMOGRAPHY | Age: 55
Discharge: HOME | End: 2025-03-18
Payer: COMMERCIAL

## 2025-03-15 DIAGNOSIS — Z12.31 ENCOUNTER FOR SCREENING MAMMOGRAM FOR MALIGNANT NEOPLASM OF BREAST: ICD-10-CM

## 2025-03-15 PROCEDURE — 77067 SCR MAMMO BI INCL CAD: CPT

## 2025-03-16 ENCOUNTER — RESULTS FOLLOW-UP (OUTPATIENT)
Dept: MAMMOGRAPHY | Age: 55
End: 2025-03-16

## 2025-03-18 ENCOUNTER — OFFICE VISIT (OUTPATIENT)
Age: 55
End: 2025-03-18
Payer: COMMERCIAL

## 2025-03-18 DIAGNOSIS — M54.50 LOW BACK PAIN, UNSPECIFIED BACK PAIN LATERALITY, UNSPECIFIED CHRONICITY, UNSPECIFIED WHETHER SCIATICA PRESENT: ICD-10-CM

## 2025-03-18 DIAGNOSIS — M51.362 DEGENERATION OF INTERVERTEBRAL DISC OF LUMBAR REGION WITH DISCOGENIC BACK PAIN AND LOWER EXTREMITY PAIN: Primary | ICD-10-CM

## 2025-03-18 DIAGNOSIS — M54.16 LUMBAR RADICULOPATHY: ICD-10-CM

## 2025-03-18 PROCEDURE — 99213 OFFICE O/P EST LOW 20 MIN: CPT | Performed by: NURSE PRACTITIONER

## 2025-03-18 NOTE — PROGRESS NOTES
Encounter   Procedures    MRI LUMBAR SPINE WO CONTRAST        3 This is stable chronic illness/condition      Return for MRI results.     FLOYD Pond - CNP  03/18/25      Elements of this note were created using speech recognition software.  As such, errors of speech recognition may be present.

## 2025-03-21 NOTE — PROGRESS NOTES
VM left with  services, arrival time of 1030 am for procedure at 1200. Patient informed that their ride must stay at the hospital throughout procedure. Medications and history reviewed in chart by this RN.

## 2025-03-22 PROBLEM — Z12.11 ENCOUNTER FOR SCREENING COLONOSCOPY: Status: RESOLVED | Noted: 2025-02-20 | Resolved: 2025-03-22

## 2025-03-24 ENCOUNTER — ANESTHESIA EVENT (OUTPATIENT)
Dept: ENDOSCOPY | Age: 55
End: 2025-03-24
Payer: COMMERCIAL

## 2025-03-24 ENCOUNTER — HOSPITAL ENCOUNTER (OUTPATIENT)
Age: 55
Discharge: HOME | End: 2025-03-24
Attending: INTERNAL MEDICINE | Admitting: INTERNAL MEDICINE
Payer: COMMERCIAL

## 2025-03-24 ENCOUNTER — ANESTHESIA (OUTPATIENT)
Dept: ENDOSCOPY | Age: 55
End: 2025-03-24
Payer: COMMERCIAL

## 2025-03-24 VITALS
RESPIRATION RATE: 18 BRPM | HEART RATE: 68 BPM | WEIGHT: 165 LBS | HEIGHT: 63 IN | BODY MASS INDEX: 29.23 KG/M2 | DIASTOLIC BLOOD PRESSURE: 78 MMHG | OXYGEN SATURATION: 99 % | TEMPERATURE: 97.7 F | SYSTOLIC BLOOD PRESSURE: 140 MMHG

## 2025-03-24 PROCEDURE — 2709999900 HC NON-CHARGEABLE SUPPLY: Performed by: INTERNAL MEDICINE

## 2025-03-24 PROCEDURE — 7100000011 HC PHASE II RECOVERY - ADDTL 15 MIN: Performed by: INTERNAL MEDICINE

## 2025-03-24 PROCEDURE — 3609027000 HC COLONOSCOPY: Performed by: INTERNAL MEDICINE

## 2025-03-24 PROCEDURE — 6360000002 HC RX W HCPCS: Performed by: REGISTERED NURSE

## 2025-03-24 PROCEDURE — 2580000003 HC RX 258: Performed by: REGISTERED NURSE

## 2025-03-24 PROCEDURE — 45378 DIAGNOSTIC COLONOSCOPY: CPT | Performed by: INTERNAL MEDICINE

## 2025-03-24 PROCEDURE — 3700000000 HC ANESTHESIA ATTENDED CARE: Performed by: INTERNAL MEDICINE

## 2025-03-24 PROCEDURE — 7100000010 HC PHASE II RECOVERY - FIRST 15 MIN: Performed by: INTERNAL MEDICINE

## 2025-03-24 RX ORDER — LIDOCAINE HYDROCHLORIDE 20 MG/ML
INJECTION, SOLUTION EPIDURAL; INFILTRATION; INTRACAUDAL; PERINEURAL
Status: DISCONTINUED | OUTPATIENT
Start: 2025-03-24 | End: 2025-03-24 | Stop reason: SDUPTHER

## 2025-03-24 RX ORDER — SODIUM CHLORIDE, SODIUM LACTATE, POTASSIUM CHLORIDE, CALCIUM CHLORIDE 600; 310; 30; 20 MG/100ML; MG/100ML; MG/100ML; MG/100ML
INJECTION, SOLUTION INTRAVENOUS
Status: DISCONTINUED | OUTPATIENT
Start: 2025-03-24 | End: 2025-03-24 | Stop reason: SDUPTHER

## 2025-03-24 RX ORDER — PROPOFOL 10 MG/ML
INJECTION, EMULSION INTRAVENOUS
Status: DISCONTINUED | OUTPATIENT
Start: 2025-03-24 | End: 2025-03-24 | Stop reason: SDUPTHER

## 2025-03-24 RX ORDER — MULTIVIT-MIN/IRON/FOLIC ACID/K 18-600-40
2000 CAPSULE ORAL DAILY
COMMUNITY

## 2025-03-24 RX ADMIN — PROPOFOL 50 MG: 10 INJECTION, EMULSION INTRAVENOUS at 12:47

## 2025-03-24 RX ADMIN — PROPOFOL 180 MCG/KG/MIN: 10 INJECTION, EMULSION INTRAVENOUS at 12:48

## 2025-03-24 RX ADMIN — SODIUM CHLORIDE, SODIUM LACTATE, POTASSIUM CHLORIDE, AND CALCIUM CHLORIDE: 600; 310; 30; 20 INJECTION, SOLUTION INTRAVENOUS at 12:43

## 2025-03-24 RX ADMIN — LIDOCAINE HYDROCHLORIDE 40 MG: 20 INJECTION, SOLUTION EPIDURAL; INFILTRATION; INTRACAUDAL; PERINEURAL at 12:47

## 2025-03-24 ASSESSMENT — PAIN - FUNCTIONAL ASSESSMENT: PAIN_FUNCTIONAL_ASSESSMENT: NONE - DENIES PAIN

## 2025-03-24 ASSESSMENT — PAIN SCALES - GENERAL
PAINLEVEL_OUTOF10: 0

## 2025-03-24 ASSESSMENT — LIFESTYLE VARIABLES: SMOKING_STATUS: 0

## 2025-03-24 NOTE — H&P
GASTROENTEROLOGY H&P    SabaSOUMYA Nguyen is 54 y.o. y/o female here for CRC screening.        Past Medical History:   Diagnosis Date    Anxiety     managed eith medication    Arthritis     rt thumb    COVID-19 2022    not hospitalized    PONV (postoperative nausea and vomiting)      Past Surgical History:   Procedure Laterality Date    COLONOSCOPY N/A 1/30/2023    COLONOSCOPY POLYPECTOMY SNARE/COLD BIOPSY performed by Velvet Ruth MD at [facility] ENDOSCOPY    KNEE ARTHROSCOPY Right 4/19/2023    RIGHT KNEE ARTHROSCOPY MAJOR SYNOVECTOMY,  LATERAL MENISECTOMY performed by Seth Marshall MD at [facility] OPC    OTHER SURGICAL HISTORY      vaginal wall mesh    OTHER SURGICAL HISTORY      gum surgery    OTHER SURGICAL HISTORY      Partial hysterectomy cervix removed per pt    TUBAL LIGATION       Family History   Problem Relation Age of Onset    High Cholesterol Mother     High Blood Pressure Mother     Heart Attack Father     Diabetes Brother     Breast Cancer Neg Hx      Social History     Tobacco Use    Smoking status: Never     Passive exposure: Never    Smokeless tobacco: Never   Vaping Use    Vaping status: Never Used   Substance Use Topics    Alcohol use: Not Currently    Drug use: Not Currently         PE:   General:  The patient appears well-nourished, and is in no acute distress.    HEENT:  Normocephalic, atraumatic. No sclerae icterus.   Respiratory: Respiratory effort is normal.     Cardiovascular:  Regular rate and rhythm.     Abdomen:  Soft, non tender to palpation. No distention.     Assessment and Plan:   CRC screening     Proceed with colonoscopy. Further recommendations to follow procedure.       Jayda Caputo MD  [facility]

## 2025-03-24 NOTE — ANESTHESIA POSTPROCEDURE EVALUATION
Department of Anesthesiology  Postprocedure Note    Patient: Johanny Nguyen  MRN: [id number]  YOB: 1970  Date of evaluation: 3/24/2025    Procedure Summary       Date: 03/24/25 Room / Location: [facility] ENDO 03 / [facility] ENDOSCOPY    Anesthesia Start: 1242 Anesthesia Stop: 1302    Procedure: COLORECTAL CANCER SCREENING, NOT HIGH RISK Diagnosis:       Encounter for screening colonoscopy      (Encounter for screening colonoscopy [Z12.11])    Surgeons: Jayda Caputo MD Responsible Provider: Stewart Ravi MD    Anesthesia Type: TIVA ASA Status: 2            Anesthesia Type: No value filed.    Cameron Phase I: Cameron Score: 10    Cameron Phase II: Cameron Score: 10    Anesthesia Post Evaluation    Patient location during evaluation: PACU  Patient participation: complete - patient participated  Level of consciousness: awake and alert  Airway patency: patent  Nausea & Vomiting: no nausea and no vomiting  Cardiovascular status: hemodynamically stable  Respiratory status: acceptable, nonlabored ventilation and spontaneous ventilation  Hydration status: euvolemic  Comments: /84   Pulse 74   Temp 97.7 °F (36.5 °C) (Temporal)   Resp 18   Ht 1.588 m (5' 2.5\")   Wt 74.8 kg (165 lb)   SpO2 100%   BMI 29.70 kg/m²     Multimodal analgesia pain management approach  Pain management: adequate and satisfactory to patient        No notable events documented.

## 2025-03-24 NOTE — ANESTHESIA PRE PROCEDURE
Department of Anesthesiology  Preprocedure Note       Name:  Johanny Nguyen   Age:  54 y.o.  :  1970                                          MRN:  [id number]         Date:  3/24/2025      Surgeon: Surgeon(s):  Jayda Caputo MD    Procedure: Procedure(s):  COLORECTAL CANCER SCREENING, NOT HIGH RISK    Medications prior to admission:   Prior to Admission medications    Medication Sig Start Date End Date Taking? Authorizing Provider   vitamin D 50 MCG (2000) CAPS capsule Take 1 capsule by mouth daily   Yes Provider, MD Zhang   tiZANidine (ZANAFLEX) 2 MG tablet Take 1 tablet by mouth 3 times daily as needed (Muscle spasm) 25  Yes Jacobo Corral APRN - CNP   omeprazole (PRILOSEC) 20 MG delayed release capsule Take 1 capsule by mouth every morning (before breakfast) 25  Yes Kadeem Rutherford APRN - NP   predniSONE (DELTASONE) 20 MG tablet 1 tab po bid with food for four days then 1 tab po daily with food for three days  Patient not taking: Reported on 2025   Kadeem Rutherford APRN - NP   tiZANidine (ZANAFLEX) 2 MG tablet Take 1 tablet by mouth nightly as needed (low back pain/spasm-watch for sedation) 25   Kadeem Rutherford APRN - NP   atorvastatin (LIPITOR) 40 MG tablet Take 1 tablet by mouth daily  Patient not taking: Reported on 2025   Kadeem Rutherford APRN - NP       Current medications:    No current facility-administered medications for this encounter.       Allergies:  No Known Allergies    Problem List:    Patient Active Problem List   Diagnosis Code    Depression F32.A    Anxiety F41.9    Insomnia G47.00    Mixed hyperlipidemia E78.2    Asymptomatic cholelithiasis K80.20    Chronic left-sided low back pain with left-sided sciatica M54.42, G89.29    Gastroesophageal reflux disease without esophagitis K21.9    Alkaline phosphatase elevation R74.8       Past Medical History:        Diagnosis Date    Anxiety     managed [facility]

## 2025-03-24 NOTE — DISCHARGE INSTRUCTIONS
Gastrointestinal Colonoscopy/Flexible Sigmoidoscopy - Lower Exam Discharge Instructions    Call Dr. Caputo at [phone] for any problems or questions.  Contact the doctor’s office for follow up appointment as directed  Medication may cause drowsiness for several hours, therefore:  Do not drive or operate machinery for reminder of the day.  No alcohol today.  Do not make any important or legal decisions for 24 hours.  Do not sign any legal documents for 24 hours.  Ordinarily, you may resume regular diet and activity after exam unless otherwise specified by your physician.  Because of air put into your colon during exam, you may experience some abdominal distension, relieved by the passage of gas, for several hours.  Contact your physician if you have any of the following:  Excessive amount of bleeding - large amount when having a bowel movement.  Occasional specks of blood with bowel movement would not be unusual.  Severe abdominal pain  Fever or Chills      Any additional instructions:    Repeat colonoscopy within 3 months for screening purposes.           Discharge instructions were reviewed with patient. An opportunity was given for questions. All medications were reviewed, and information was given on the new medications. Patient verbalized understanding, and has no questions at this time.

## 2025-03-25 ENCOUNTER — TELEPHONE (OUTPATIENT)
Age: 55
End: 2025-03-25

## 2025-03-25 NOTE — TELEPHONE ENCOUNTER
Per staff message from Dr. Caputo Please schedule patient for repeat colonoscopy due to poor bowel prep. Golytely bowel prep. Two days clear liquids. Called the patient with a  and left a voicemail for return call.

## 2025-04-02 ENCOUNTER — TELEPHONE (OUTPATIENT)
Age: 55
End: 2025-04-02

## 2025-04-04 ENCOUNTER — TELEPHONE (OUTPATIENT)
Age: 55
End: 2025-04-04

## 2025-04-04 NOTE — TELEPHONE ENCOUNTER
Per staff message from Dr. Caputo Please schedule patient for repeat colonoscopy due to poor bowel prep. Golytely bowel prep. Two days clear liquids. Called the patient with a  and left a voicemail for return call, third attempt.

## 2025-04-25 ENCOUNTER — OFFICE VISIT (OUTPATIENT)
Age: 55
End: 2025-04-25
Payer: COMMERCIAL

## 2025-04-25 DIAGNOSIS — M54.16 LUMBAR RADICULOPATHY: Primary | ICD-10-CM

## 2025-04-25 DIAGNOSIS — M48.061 STENOSIS OF LATERAL RECESS OF LUMBAR SPINE: ICD-10-CM

## 2025-04-25 DIAGNOSIS — M48.061 LUMBAR FORAMINAL STENOSIS: ICD-10-CM

## 2025-04-25 PROCEDURE — 99213 OFFICE O/P EST LOW 20 MIN: CPT | Performed by: NURSE PRACTITIONER

## 2025-04-25 NOTE — PROGRESS NOTES
Name: Johanny Nguyen  YOB: 1970  Gender: female  MRN: [id number]    CC: Follow-up left leg pain    HPI: This is a 54 y.o. year old female who presented with complaints of pain in the lower back rating in the left anterior lateral leg to her knee.  Been ongoing for 8 months.  She tried steroids, tizanidine, conservative treatment which included home exercise regimen from myself and her primary care provider.  X-rays revealed advanced disc degeneration at L5-S1 and lower lumbar facet arthropathy.  At last visit patient was referred for an MRI of the lumbar spine.    Thus far, the patient has tried muscle relaxers, oral steroids, and physician directed home exercise program.               2/25/2025    11:14 AM   AMB PAIN ASSESSMENT   Location of Pain Back   Location Modifiers Right;Left   Severity of Pain 6   Frequency of Pain Constant   Limiting Behavior Yes   Result of Injury No   Work-Related Injury No   Are there other pain locations you wish to document? No            No Known Allergies    Current Outpatient Medications:     vitamin D 50 MCG (2000 UT) CAPS capsule, Take 1 capsule by mouth daily, Disp: , Rfl:     tiZANidine (ZANAFLEX) 2 MG tablet, Take 1 tablet by mouth 3 times daily as needed (Muscle spasm), Disp: 30 tablet, Rfl: 0    omeprazole (PRILOSEC) 20 MG delayed release capsule, Take 1 capsule by mouth every morning (before breakfast), Disp: 90 capsule, Rfl: 1    predniSONE (DELTASONE) 20 MG tablet, 1 tab po bid with food for four days then 1 tab po daily with food for three days (Patient not taking: Reported on 2/25/2025), Disp: 11 tablet, Rfl: 0    tiZANidine (ZANAFLEX) 2 MG tablet, Take 1 tablet by mouth nightly as needed (low back pain/spasm-watch for sedation), Disp: 30 tablet, Rfl: 5    atorvastatin (LIPITOR) 40 MG tablet, Take 1 tablet by mouth daily (Patient not taking: Reported on 2/6/2025), Disp: 90 tablet, Rfl: 1  Past Medical History:   Diagnosis Date    Anxiety

## 2025-07-14 ENCOUNTER — LAB (OUTPATIENT)
Dept: FAMILY MEDICINE CLINIC | Facility: CLINIC | Age: 55
End: 2025-07-14

## 2025-07-14 DIAGNOSIS — E78.2 MIXED HYPERLIPIDEMIA: ICD-10-CM

## 2025-07-14 DIAGNOSIS — R74.8 ALKALINE PHOSPHATASE ELEVATION: ICD-10-CM

## 2025-07-14 LAB
CHOLEST SERPL-MCNC: 204 MG/DL (ref 0–200)
HDLC SERPL-MCNC: 31 MG/DL (ref 40–60)
HDLC SERPL: 6.6 (ref 0–5)
LDLC SERPL CALC-MCNC: 128 MG/DL (ref 0–100)
TRIGL SERPL-MCNC: 227 MG/DL (ref 0–150)
VLDLC SERPL CALC-MCNC: 45 MG/DL (ref 6–23)

## 2025-07-17 LAB
ALP BONE CFR SERPL: 35 % (ref 14–68)
ALP INTEST CFR SERPL: 12 % (ref 0–18)
ALP LIVER CFR SERPL: 53 % (ref 18–85)
ALP SERPL-CCNC: 117 IU/L (ref 44–121)

## 2025-07-24 ENCOUNTER — OFFICE VISIT (OUTPATIENT)
Dept: FAMILY MEDICINE CLINIC | Facility: CLINIC | Age: 55
End: 2025-07-24

## 2025-07-24 VITALS
RESPIRATION RATE: 16 BRPM | WEIGHT: 167.2 LBS | HEART RATE: 89 BPM | OXYGEN SATURATION: 95 % | BODY MASS INDEX: 29.62 KG/M2 | DIASTOLIC BLOOD PRESSURE: 68 MMHG | SYSTOLIC BLOOD PRESSURE: 128 MMHG | HEIGHT: 63 IN

## 2025-07-24 DIAGNOSIS — K80.20 ASYMPTOMATIC CHOLELITHIASIS: ICD-10-CM

## 2025-07-24 DIAGNOSIS — F32.A DEPRESSION, UNSPECIFIED DEPRESSION TYPE: ICD-10-CM

## 2025-07-24 DIAGNOSIS — M51.362 DEGENERATION OF INTERVERTEBRAL DISC OF LUMBAR REGION WITH DISCOGENIC BACK PAIN AND LOWER EXTREMITY PAIN: ICD-10-CM

## 2025-07-24 DIAGNOSIS — Z23 ENCOUNTER FOR IMMUNIZATION: ICD-10-CM

## 2025-07-24 DIAGNOSIS — Z12.4 SCREENING FOR CERVICAL CANCER: ICD-10-CM

## 2025-07-24 DIAGNOSIS — F41.9 ANXIETY: ICD-10-CM

## 2025-07-24 DIAGNOSIS — M54.42 CHRONIC LEFT-SIDED LOW BACK PAIN WITH LEFT-SIDED SCIATICA: ICD-10-CM

## 2025-07-24 DIAGNOSIS — Z12.31 ENCOUNTER FOR SCREENING MAMMOGRAM FOR MALIGNANT NEOPLASM OF BREAST: ICD-10-CM

## 2025-07-24 DIAGNOSIS — E78.2 MIXED HYPERLIPIDEMIA: ICD-10-CM

## 2025-07-24 DIAGNOSIS — K21.9 GASTROESOPHAGEAL REFLUX DISEASE WITHOUT ESOPHAGITIS: ICD-10-CM

## 2025-07-24 DIAGNOSIS — G43.909 MIGRAINE WITHOUT STATUS MIGRAINOSUS, NOT INTRACTABLE, UNSPECIFIED MIGRAINE TYPE: Primary | ICD-10-CM

## 2025-07-24 DIAGNOSIS — G89.29 CHRONIC LEFT-SIDED LOW BACK PAIN WITH LEFT-SIDED SCIATICA: ICD-10-CM

## 2025-07-24 DIAGNOSIS — G47.00 INSOMNIA, UNSPECIFIED TYPE: ICD-10-CM

## 2025-07-24 DIAGNOSIS — Z12.11 SCREENING FOR COLON CANCER: ICD-10-CM

## 2025-07-24 RX ORDER — RIZATRIPTAN BENZOATE 10 MG/1
10 TABLET ORAL
Qty: 9 TABLET | Refills: 5 | Status: SHIPPED | OUTPATIENT
Start: 2025-07-24

## 2025-07-24 RX ORDER — OMEPRAZOLE 20 MG/1
20 CAPSULE, DELAYED RELEASE ORAL
Qty: 90 CAPSULE | Refills: 1 | Status: SHIPPED | OUTPATIENT
Start: 2025-07-24

## 2025-07-24 RX ORDER — ATORVASTATIN CALCIUM 40 MG/1
40 TABLET, FILM COATED ORAL DAILY
Qty: 90 TABLET | Refills: 1 | Status: SHIPPED | OUTPATIENT
Start: 2025-07-24

## 2025-07-24 ASSESSMENT — PATIENT HEALTH QUESTIONNAIRE - PHQ9
SUM OF ALL RESPONSES TO PHQ QUESTIONS 1-9: 0
SUM OF ALL RESPONSES TO PHQ QUESTIONS 1-9: 0
6. FEELING BAD ABOUT YOURSELF - OR THAT YOU ARE A FAILURE OR HAVE LET YOURSELF OR YOUR FAMILY DOWN: NOT AT ALL
3. TROUBLE FALLING OR STAYING ASLEEP: NOT AT ALL
2. FEELING DOWN, DEPRESSED OR HOPELESS: NOT AT ALL
5. POOR APPETITE OR OVEREATING: NOT AT ALL
1. LITTLE INTEREST OR PLEASURE IN DOING THINGS: NOT AT ALL
8. MOVING OR SPEAKING SO SLOWLY THAT OTHER PEOPLE COULD HAVE NOTICED. OR THE OPPOSITE, BEING SO FIGETY OR RESTLESS THAT YOU HAVE BEEN MOVING AROUND A LOT MORE THAN USUAL: NOT AT ALL
10. IF YOU CHECKED OFF ANY PROBLEMS, HOW DIFFICULT HAVE THESE PROBLEMS MADE IT FOR YOU TO DO YOUR WORK, TAKE CARE OF THINGS AT HOME, OR GET ALONG WITH OTHER PEOPLE: NOT DIFFICULT AT ALL
9. THOUGHTS THAT YOU WOULD BE BETTER OFF DEAD, OR OF HURTING YOURSELF: NOT AT ALL
4. FEELING TIRED OR HAVING LITTLE ENERGY: NOT AT ALL
SUM OF ALL RESPONSES TO PHQ QUESTIONS 1-9: 0
7. TROUBLE CONCENTRATING ON THINGS, SUCH AS READING THE NEWSPAPER OR WATCHING TELEVISION: NOT AT ALL
SUM OF ALL RESPONSES TO PHQ QUESTIONS 1-9: 0

## 2025-08-18 ENCOUNTER — TELEPHONE (OUTPATIENT)
Age: 55
End: 2025-08-18

## 2025-08-18 DIAGNOSIS — Z12.11 ENCOUNTER FOR SCREENING COLONOSCOPY: Primary | ICD-10-CM

## 2025-08-22 ASSESSMENT — ENCOUNTER SYMPTOMS
VOICE CHANGE: 0
SORE THROAT: 0
CHEST TIGHTNESS: 0
FACIAL SWELLING: 0
SINUS PRESSURE: 0
SINUS PAIN: 0
DIARRHEA: 0
WHEEZING: 0
NAUSEA: 0
EYE PAIN: 0
TROUBLE SWALLOWING: 0
ABDOMINAL PAIN: 0
ABDOMINAL DISTENTION: 0
VOMITING: 0
RESPIRATORY NEGATIVE: 1
STRIDOR: 0
BACK PAIN: 1
COUGH: 0
ANAL BLEEDING: 0
CHOKING: 0
ALLERGIC/IMMUNOLOGIC NEGATIVE: 1
RHINORRHEA: 0
EYE DISCHARGE: 0
APNEA: 0
CONSTIPATION: 0
SHORTNESS OF BREATH: 0
PHOTOPHOBIA: 0
BLOOD IN STOOL: 0
COLOR CHANGE: 0
RECTAL PAIN: 0
EYES NEGATIVE: 1

## 2025-09-02 ENCOUNTER — ANESTHESIA (OUTPATIENT)
Dept: ENDOSCOPY | Age: 55
End: 2025-09-02
Payer: COMMERCIAL

## 2025-09-02 ENCOUNTER — HOSPITAL ENCOUNTER (OUTPATIENT)
Age: 55
Setting detail: OUTPATIENT SURGERY
Discharge: HOME | End: 2025-09-02
Attending: INTERNAL MEDICINE | Admitting: INTERNAL MEDICINE
Payer: COMMERCIAL

## 2025-09-02 ENCOUNTER — ANESTHESIA EVENT (OUTPATIENT)
Dept: ENDOSCOPY | Age: 55
End: 2025-09-02
Payer: COMMERCIAL

## 2025-09-02 VITALS
DIASTOLIC BLOOD PRESSURE: 79 MMHG | BODY MASS INDEX: 28.93 KG/M2 | OXYGEN SATURATION: 99 % | HEART RATE: 70 BPM | WEIGHT: 163.3 LBS | HEIGHT: 63 IN | TEMPERATURE: 97.6 F | SYSTOLIC BLOOD PRESSURE: 107 MMHG | RESPIRATION RATE: 12 BRPM

## 2025-09-02 PROCEDURE — 7100000010 HC PHASE II RECOVERY - FIRST 15 MIN: Performed by: INTERNAL MEDICINE

## 2025-09-02 PROCEDURE — 45378 DIAGNOSTIC COLONOSCOPY: CPT | Performed by: INTERNAL MEDICINE

## 2025-09-02 PROCEDURE — 2580000003 HC RX 258: Performed by: ANESTHESIOLOGY

## 2025-09-02 PROCEDURE — 2709999900 HC NON-CHARGEABLE SUPPLY: Performed by: INTERNAL MEDICINE

## 2025-09-02 PROCEDURE — 6360000002 HC RX W HCPCS

## 2025-09-02 PROCEDURE — 3609027000 HC COLONOSCOPY: Performed by: INTERNAL MEDICINE

## 2025-09-02 PROCEDURE — 3700000001 HC ADD 15 MINUTES (ANESTHESIA): Performed by: INTERNAL MEDICINE

## 2025-09-02 PROCEDURE — 3700000000 HC ANESTHESIA ATTENDED CARE: Performed by: INTERNAL MEDICINE

## 2025-09-02 PROCEDURE — 7100000011 HC PHASE II RECOVERY - ADDTL 15 MIN: Performed by: INTERNAL MEDICINE

## 2025-09-02 RX ORDER — SODIUM CHLORIDE 9 MG/ML
INJECTION, SOLUTION INTRAVENOUS PRN
Status: CANCELLED | OUTPATIENT
Start: 2025-09-02

## 2025-09-02 RX ORDER — ONDANSETRON 2 MG/ML
4 INJECTION INTRAMUSCULAR; INTRAVENOUS
Status: DISCONTINUED | OUTPATIENT
Start: 2025-09-02 | End: 2025-09-02 | Stop reason: HOSPADM

## 2025-09-02 RX ORDER — MIDAZOLAM HYDROCHLORIDE 2 MG/2ML
2 INJECTION, SOLUTION INTRAMUSCULAR; INTRAVENOUS
Status: DISCONTINUED | OUTPATIENT
Start: 2025-09-02 | End: 2025-09-02 | Stop reason: HOSPADM

## 2025-09-02 RX ORDER — SODIUM CHLORIDE 0.9 % (FLUSH) 0.9 %
5-40 SYRINGE (ML) INJECTION PRN
Status: DISCONTINUED | OUTPATIENT
Start: 2025-09-02 | End: 2025-09-02 | Stop reason: HOSPADM

## 2025-09-02 RX ORDER — FENTANYL CITRATE 50 UG/ML
100 INJECTION, SOLUTION INTRAMUSCULAR; INTRAVENOUS
Status: DISCONTINUED | OUTPATIENT
Start: 2025-09-02 | End: 2025-09-02 | Stop reason: HOSPADM

## 2025-09-02 RX ORDER — SODIUM CHLORIDE 0.9 % (FLUSH) 0.9 %
5-40 SYRINGE (ML) INJECTION EVERY 12 HOURS SCHEDULED
Status: DISCONTINUED | OUTPATIENT
Start: 2025-09-02 | End: 2025-09-02 | Stop reason: HOSPADM

## 2025-09-02 RX ORDER — SODIUM CHLORIDE, SODIUM LACTATE, POTASSIUM CHLORIDE, CALCIUM CHLORIDE 600; 310; 30; 20 MG/100ML; MG/100ML; MG/100ML; MG/100ML
INJECTION, SOLUTION INTRAVENOUS CONTINUOUS
Status: DISCONTINUED | OUTPATIENT
Start: 2025-09-02 | End: 2025-09-02 | Stop reason: HOSPADM

## 2025-09-02 RX ORDER — ACETAMINOPHEN 500 MG
1000 TABLET ORAL ONCE
Status: CANCELLED | OUTPATIENT
Start: 2025-09-02 | End: 2025-09-02

## 2025-09-02 RX ORDER — PROPOFOL 10 MG/ML
INJECTION, EMULSION INTRAVENOUS
Status: DISCONTINUED | OUTPATIENT
Start: 2025-09-02 | End: 2025-09-02 | Stop reason: SDUPTHER

## 2025-09-02 RX ORDER — LIDOCAINE HYDROCHLORIDE 20 MG/ML
INJECTION, SOLUTION EPIDURAL; INFILTRATION; INTRACAUDAL; PERINEURAL
Status: DISCONTINUED | OUTPATIENT
Start: 2025-09-02 | End: 2025-09-02 | Stop reason: SDUPTHER

## 2025-09-02 RX ORDER — DIPHENHYDRAMINE HYDROCHLORIDE 50 MG/ML
12.5 INJECTION, SOLUTION INTRAMUSCULAR; INTRAVENOUS
Status: DISCONTINUED | OUTPATIENT
Start: 2025-09-02 | End: 2025-09-02 | Stop reason: HOSPADM

## 2025-09-02 RX ORDER — OXYCODONE HYDROCHLORIDE 5 MG/1
5 TABLET ORAL
Status: DISCONTINUED | OUTPATIENT
Start: 2025-09-02 | End: 2025-09-02 | Stop reason: HOSPADM

## 2025-09-02 RX ADMIN — PROPOFOL 160 MCG/KG/MIN: 10 INJECTION, EMULSION INTRAVENOUS at 13:31

## 2025-09-02 RX ADMIN — PROPOFOL 60 MG: 10 INJECTION, EMULSION INTRAVENOUS at 13:30

## 2025-09-02 RX ADMIN — LIDOCAINE HYDROCHLORIDE 20 MG: 20 INJECTION, SOLUTION EPIDURAL; INFILTRATION; INTRACAUDAL; PERINEURAL at 13:30

## 2025-09-02 RX ADMIN — SODIUM CHLORIDE, SODIUM LACTATE, POTASSIUM CHLORIDE, AND CALCIUM CHLORIDE: 600; 310; 30; 20 INJECTION, SOLUTION INTRAVENOUS at 13:26

## 2025-09-02 ASSESSMENT — PAIN - FUNCTIONAL ASSESSMENT: PAIN_FUNCTIONAL_ASSESSMENT: 0-10

## 2025-09-02 ASSESSMENT — PAIN SCALES - GENERAL
PAINLEVEL_OUTOF10: 0

## 2025-09-02 ASSESSMENT — LIFESTYLE VARIABLES: SMOKING_STATUS: 0

## (undated) DEVICE — KENDALL RADIOLUCENT FOAM MONITORING ELECTRODE RECTANGULAR SHAPE: Brand: KENDALL

## (undated) DEVICE — CONNECTOR TBNG OD5-7MM O2 END DISP

## (undated) DEVICE — ENDOSCOPIC KIT 1.1+ OP4 CA DE 2 GWN AAMI LEVEL 3

## (undated) DEVICE — GOWN,PREVENTION PLUS,XL,ST,24/CS: Brand: MEDLINE

## (undated) DEVICE — TUBING PMP L16FT MAIN DISP FOR AR-6400 AR-6475

## (undated) DEVICE — PROBE ABLAT 90DEG ASPIR MULTIPORT BPLR RF 1 PC ELECTRD ERGO

## (undated) DEVICE — SYRINGE MEDICAL 3ML CLEAR PLASTIC STANDARD NON CONTROL LUERLOCK TIP DISPOSABLE

## (undated) DEVICE — MANIFOLD SUCT SMK EVAC SGL PRT DISP NEPTUNE 2

## (undated) DEVICE — LUBE JELLY FOIL PACK 1.4 OZ: Brand: MEDLINE INDUSTRIES, INC.

## (undated) DEVICE — 1010 S-DRAPE TOWEL DRAPE 10/BX: Brand: STERI-DRAPE™

## (undated) DEVICE — TUBING, SUCTION, 1/4" X 10', STRAIGHT: Brand: MEDLINE

## (undated) DEVICE — SYRINGE MED 10ML LUERLOCK TIP W/O SFTY DISP

## (undated) DEVICE — SINGLE PORT MANIFOLD: Brand: NEPTUNE 2

## (undated) DEVICE — TUBING O2 L7FT CRUSH RESIST

## (undated) DEVICE — CANNULA NSL ORAL AD FOR CAPNOFLEX CO2 O2 AIRLFE

## (undated) DEVICE — SPONGE GZ W4XL4IN RAYON POLY CONSTRUCTED WV FINISHED EDGE

## (undated) DEVICE — AIRLIFE™ OXYGEN TUBING 7 FEET (2.1 M) CRUSH RESISTANT OXYGEN TUBING, VINYL TIPPED: Brand: AIRLIFE™

## (undated) DEVICE — SNARE POLYP SM W13MMXL240CM SHTH DIA2.4MM OVL FLX DISP

## (undated) DEVICE — NEEDLE SYRINGE 18GA L1.5IN RED PLAS HUB S STL BLNT FILL W/O

## (undated) DEVICE — SPONGE GZ W4XL4IN COT 12 PLY TYP VII WVN C FLD DSGN STERILE

## (undated) DEVICE — PAD,ABDOMINAL,5"X9",ST,LF,25/BX: Brand: MEDLINE INDUSTRIES, INC.

## (undated) DEVICE — ELECTRODE EKG 1 3/8X17/8IN SQ SHP AD FOAM BK SNAP CONN GEL

## (undated) DEVICE — Device

## (undated) DEVICE — GAUZE,SPONGE,4"X4",12PLY,WOVEN,NS,LF: Brand: MEDLINE

## (undated) DEVICE — TRAP SPEC POLYP REM STRNR CLN DSGN MAGNIFYING WIND DISP

## (undated) DEVICE — SYRINGE, LUER SLIP, STERILE, 60ML: Brand: MEDLINE

## (undated) DEVICE — SOLUTION IRRIG 1000ML H2O PIC PLAS SHATTERPROOF CONTAINER

## (undated) DEVICE — CONTAINER FORMALIN PREFILLED 10% NBF 60ML

## (undated) DEVICE — BLADE SHV L13CM DIA4MM CVD EXCALIBUR AGG COOLCUT

## (undated) DEVICE — BANDAGE COMPR W6INXL12FT SMOOTH FOR LIMB EXSANG ESMARCH

## (undated) DEVICE — SYRINGE MED 3ML CLR PLAS STD N CTRL LUERLOCK TIP DISP

## (undated) DEVICE — NEEDLE SYR 18GA L1.5IN RED PLAS HUB S STL BLNT FILL W/O

## (undated) DEVICE — DISPOSABLE BIOPSY VALVE MAJ-1555: Brand: SINGLE USE BIOPSY VALVE (STERILE)

## (undated) DEVICE — SOLUTION IRRIG 3000ML 0.9% SOD CHL USP UROMATIC PLAS CONT

## (undated) DEVICE — KNEE ARTHROSCOPY DR KOCH: Brand: MEDLINE INDUSTRIES, INC.

## (undated) DEVICE — LUBE JELLY FOIL PACK 1.4 OZ

## (undated) DEVICE — YANKAUER,BULB TIP,W/O VENT,RIGID,STERILE: Brand: MEDLINE

## (undated) DEVICE — ZIMMER® STERILE DISPOSABLE TOURNIQUET CUFF WITH PLC, DUAL PORT, SINGLE BLADDER, 30 IN. (76 CM)